# Patient Record
Sex: FEMALE | Race: WHITE | NOT HISPANIC OR LATINO | Employment: FULL TIME | URBAN - METROPOLITAN AREA
[De-identification: names, ages, dates, MRNs, and addresses within clinical notes are randomized per-mention and may not be internally consistent; named-entity substitution may affect disease eponyms.]

---

## 2023-05-10 ENCOUNTER — OFFICE VISIT (OUTPATIENT)
Dept: PRIMARY CARE | Facility: CLINIC | Age: 48
End: 2023-05-10
Payer: COMMERCIAL

## 2023-05-10 VITALS
DIASTOLIC BLOOD PRESSURE: 62 MMHG | TEMPERATURE: 97.7 F | HEART RATE: 62 BPM | WEIGHT: 142 LBS | SYSTOLIC BLOOD PRESSURE: 118 MMHG | BODY MASS INDEX: 26.13 KG/M2 | HEIGHT: 62 IN | RESPIRATION RATE: 16 BRPM

## 2023-05-10 DIAGNOSIS — R11.15 CYCLIC VOMITING SYNDROME: Primary | ICD-10-CM

## 2023-05-10 DIAGNOSIS — R11.0 NAUSEA: ICD-10-CM

## 2023-05-10 DIAGNOSIS — Z00.00 ANNUAL PHYSICAL EXAM: ICD-10-CM

## 2023-05-10 DIAGNOSIS — N95.9 MENOPAUSAL DISORDER: ICD-10-CM

## 2023-05-10 DIAGNOSIS — F41.9 ANXIETY: ICD-10-CM

## 2023-05-10 LAB
APPEARANCE UR: CLEAR
BILIRUB UR QL STRIP: NEGATIVE
COLOR UR: YELLOW
FOLLITROPIN (IU/L) IN SER/PLAS: 3.7 IU/L
GLUCOSE UR STRIP-MCNC: NEGATIVE MG/DL
HGB UR QL STRIP: NEGATIVE
KETONES UR STRIP-MCNC: NEGATIVE MG/DL
LEUKOCYTE ESTERASE UR QL STRIP: NEGATIVE
LUTEINIZING HORMONE (IU/ML) IN SER/PLAS: 0.5 IU/L
NITRITE UR QL STRIP: NEGATIVE
PH UR STRIP: 7 [PH]
PROT UR STRIP-MCNC: NEGATIVE MG/DL
SP GR UR STRIP.AUTO: 1.02
UROBILINOGEN UR STRIP-ACNC: 0.2 E.U./DL

## 2023-05-10 PROCEDURE — 99396 PREV VISIT EST AGE 40-64: CPT | Performed by: INTERNAL MEDICINE

## 2023-05-10 PROCEDURE — 80061 LIPID PANEL: CPT | Performed by: INTERNAL MEDICINE

## 2023-05-10 PROCEDURE — 93000 ELECTROCARDIOGRAM COMPLETE: CPT | Performed by: INTERNAL MEDICINE

## 2023-05-10 PROCEDURE — 80053 COMPREHEN METABOLIC PANEL: CPT | Performed by: INTERNAL MEDICINE

## 2023-05-10 PROCEDURE — 83001 ASSAY OF GONADOTROPIN (FSH): CPT

## 2023-05-10 PROCEDURE — 1036F TOBACCO NON-USER: CPT | Performed by: INTERNAL MEDICINE

## 2023-05-10 PROCEDURE — 83036 HEMOGLOBIN GLYCOSYLATED A1C: CPT | Performed by: INTERNAL MEDICINE

## 2023-05-10 PROCEDURE — 85025 COMPLETE CBC W/AUTO DIFF WBC: CPT | Performed by: INTERNAL MEDICINE

## 2023-05-10 PROCEDURE — 83002 ASSAY OF GONADOTROPIN (LH): CPT

## 2023-05-10 PROCEDURE — 84443 ASSAY THYROID STIM HORMONE: CPT | Performed by: INTERNAL MEDICINE

## 2023-05-10 RX ORDER — AMITRIPTYLINE HYDROCHLORIDE 25 MG/1
25 TABLET, FILM COATED ORAL NIGHTLY
Qty: 90 TABLET | Refills: 2 | Status: SHIPPED | OUTPATIENT
Start: 2023-05-10 | End: 2023-09-11 | Stop reason: SDUPTHER

## 2023-05-10 RX ORDER — ONDANSETRON 4 MG/1
4 TABLET, FILM COATED ORAL EVERY 8 HOURS PRN
Qty: 30 TABLET | Refills: 1 | Status: SHIPPED | OUTPATIENT
Start: 2023-05-10 | End: 2023-05-20

## 2023-05-10 RX ORDER — AMITRIPTYLINE HYDROCHLORIDE 10 MG/1
25 TABLET, FILM COATED ORAL NIGHTLY
Qty: 90 TABLET | Refills: 2 | Status: SHIPPED | OUTPATIENT
Start: 2023-05-10 | End: 2023-05-10 | Stop reason: SDUPTHER

## 2023-05-10 RX ORDER — AMITRIPTYLINE HYDROCHLORIDE 10 MG/1
10 TABLET, FILM COATED ORAL NIGHTLY
COMMUNITY
Start: 2022-08-24 | End: 2023-05-10 | Stop reason: SDUPTHER

## 2023-05-10 ASSESSMENT — ENCOUNTER SYMPTOMS
ABDOMINAL PAIN: 0
FATIGUE: 1
NAUSEA: 1
NERVOUS/ANXIOUS: 1
HEADACHES: 1
DIZZINESS: 1
ABDOMINAL DISTENTION: 0
SLEEP DISTURBANCE: 1
ACTIVITY CHANGE: 1
ARTHRALGIAS: 0
BACK PAIN: 0

## 2023-05-10 ASSESSMENT — PAIN SCALES - GENERAL: PAINLEVEL: 0-NO PAIN

## 2023-05-10 NOTE — PROGRESS NOTES
Subjective    Jessica Brown is a 47 y.o. female who presents for  Annual Physical Exam.  Has been having episodes of nausea, emesis episodes , worse in am.  Better, as a day progress.  Has episodes of anxiety.  Under a lot of stress lately.  Cannot concentrate.    HPI    This is a 47 years old [place]ym[place]-speaking lady with medical history of anxiety disorder, depression, s/p COVID -19 illness 12/2021.  Patient is  presented for Annual Physical exam.  Patient has episodes of nausea, emesis , worse in am.  Patient is weak, has anxiety in am.     Has burning sensation in her head.     Denies to have any fever, chills, no shortness of breath, no chest pain.  Has had COVID test done, was negative.        Review of Systems   Constitutional:  Positive for activity change and fatigue.   Gastrointestinal:  Positive for nausea. Negative for abdominal distention and abdominal pain.   Musculoskeletal:  Negative for arthralgias and back pain.   Neurological:  Positive for dizziness and headaches.   Psychiatric/Behavioral:  Positive for sleep disturbance. The patient is nervous/anxious.        Objective        Vitals:    05/10/23 0926   BP: 118/62   Pulse: 62   Resp: 16   Temp: 36.5 °C (97.7 °F)        Physical Exam  Constitutional:       Appearance: Normal appearance.   HENT:      Head: Normocephalic.      Nose: Nose normal.      Mouth/Throat:      Mouth: Mucous membranes are moist.   Cardiovascular:      Rate and Rhythm: Normal rate and regular rhythm.   Pulmonary:      Effort: Pulmonary effort is normal.      Breath sounds: Normal breath sounds.   Abdominal:      Palpations: Abdomen is soft.   Musculoskeletal:         General: Normal range of motion.      Cervical back: Normal range of motion.   Skin:     General: Skin is warm.   Neurological:      General: No focal deficit present.      Mental Status: She is alert and oriented to person, place, and time. Mental status is at baseline.       Diagnoses and all orders for this  visit:  Cyclic vomiting syndrome (Primary)  -     Discontinue: amitriptyline (Elavil) 10 mg tablet; Take 2.5 tablets (25 mg) by mouth once daily at bedtime.  -     amitriptyline (Elavil) 25 mg tablet; Take 1 tablet (25 mg) by mouth once daily at bedtime.  Annual physical exam  -     CBC  -     Comprehensive Metabolic Panel  -     ECG 12 lead  -     Hemoglobin A1C  -     Lipid Panel  -     Thyroid Stimulating Hormone  -     POCT UA (Automated) docked device  Menopausal disorder  -     FSH & LH  Anxiety  -     vortioxetine (Trintellix) 5 mg tablet tablet; Take 1 tablet (5 mg) by mouth once daily.  Nausea  -     ondansetron (Zofran) 4 mg tablet; Take 1 tablet (4 mg) by mouth every 8 hours if needed for vomiting for up to 10 days.  Other orders  -     POCT URINALYSIS AUTOMATED     -Depression, anxiety.  Take Trintellix as directed.    - Headaches,  Burning sensation in her head.  CT scan is negative.     - H of COVID -19 illness 12/2021.   Recovered.      -H of Candida laryngitis, esophagitis.  Resolved now.     -Mild vitamin D deficiency.  Take vitamin D daily.     -Elevated liver enzymes.  Liver ultrasound.     - Abnormal mammography August 2020.  S/p stereotactic core biopsy lower outer with Top-Hat clip placement 8/25/2020.  Stromal fibrosis and apocrine metaplastic cysts.  Microcalcifications identified.  Repeat mammography in 1 year, referral is placed.     -Health maintenance.  Complete  physical exam is today.  Last mammography August 2020, has had biopsy done, negative.  Vaccinations.     -Overweight with BMI 25.97  Diet, exercise.  Keep ideal BMI less than 25.     July Law MD

## 2023-05-11 NOTE — PROGRESS NOTES
Discussed with patient about blood work results.  Cholesterol is elevated to 14, LDL is 118.  Keep a strict diet, exercise, weight loss.

## 2023-06-21 ENCOUNTER — OFFICE VISIT (OUTPATIENT)
Dept: PRIMARY CARE | Facility: CLINIC | Age: 48
End: 2023-06-21
Payer: COMMERCIAL

## 2023-06-21 VITALS
HEIGHT: 62 IN | SYSTOLIC BLOOD PRESSURE: 118 MMHG | HEART RATE: 62 BPM | TEMPERATURE: 97.7 F | RESPIRATION RATE: 16 BRPM | BODY MASS INDEX: 26.13 KG/M2 | WEIGHT: 142 LBS | DIASTOLIC BLOOD PRESSURE: 78 MMHG

## 2023-06-21 DIAGNOSIS — M25.50 ARTHRALGIA, UNSPECIFIED JOINT: ICD-10-CM

## 2023-06-21 DIAGNOSIS — M79.10 MYALGIA: ICD-10-CM

## 2023-06-21 DIAGNOSIS — R10.9 ABDOMINAL PAIN, UNSPECIFIED ABDOMINAL LOCATION: Primary | ICD-10-CM

## 2023-06-21 LAB
CREATINE KINASE (U/L) IN SER/PLAS: 125 U/L (ref 0–215)
RHEUMATOID FACTOR (IU/ML) IN SERUM OR PLASMA: <10 IU/ML (ref 0–15)
SEDIMENTATION RATE, ERYTHROCYTE: 7 MM/H (ref 0–20)
URATE (MG/DL) IN SER/PLAS: 3.4 MG/DL (ref 2.3–6.7)

## 2023-06-21 PROCEDURE — 1036F TOBACCO NON-USER: CPT | Performed by: INTERNAL MEDICINE

## 2023-06-21 PROCEDURE — 86431 RHEUMATOID FACTOR QUANT: CPT

## 2023-06-21 PROCEDURE — 82306 VITAMIN D 25 HYDROXY: CPT | Performed by: INTERNAL MEDICINE

## 2023-06-21 PROCEDURE — 82607 VITAMIN B-12: CPT | Performed by: INTERNAL MEDICINE

## 2023-06-21 PROCEDURE — 99213 OFFICE O/P EST LOW 20 MIN: CPT | Performed by: INTERNAL MEDICINE

## 2023-06-21 PROCEDURE — 84550 ASSAY OF BLOOD/URIC ACID: CPT

## 2023-06-21 PROCEDURE — 86225 DNA ANTIBODY NATIVE: CPT

## 2023-06-21 PROCEDURE — 82550 ASSAY OF CK (CPK): CPT

## 2023-06-21 PROCEDURE — 86200 CCP ANTIBODY: CPT

## 2023-06-21 PROCEDURE — 86038 ANTINUCLEAR ANTIBODIES: CPT

## 2023-06-21 PROCEDURE — 85652 RBC SED RATE AUTOMATED: CPT

## 2023-06-21 PROCEDURE — 86235 NUCLEAR ANTIGEN ANTIBODY: CPT

## 2023-06-21 ASSESSMENT — ENCOUNTER SYMPTOMS
SHORTNESS OF BREATH: 0
MYALGIAS: 1
ACTIVITY CHANGE: 1
WEAKNESS: 1
UNEXPECTED WEIGHT CHANGE: 0
PALPITATIONS: 0
FATIGUE: 1
CHEST TIGHTNESS: 0
APPETITE CHANGE: 1

## 2023-06-21 ASSESSMENT — PAIN SCALES - GENERAL: PAINLEVEL: 0-NO PAIN

## 2023-06-21 NOTE — PROGRESS NOTES
Subjective    Jessica Brown is a 47 y.o. female who presents for follow up.  Complaining of myalgias.  Has been weak, has low energy.    HPI    This is a 47 years old Romanian-speaking lady with medical history of anxiety disorder, depression, s/p COVID -19 illness 12/2021.  Patient is  presented for  follow up.  Weak.  Taking a small dose of trintellix.  No emesis, nausea episodes.  Interested to see GI.  Has abdominal discomfort.  Seen by [facility], had offered many supplements to take.  Was advised, that has hernia and Thyroid problems.     Review of Systems   Constitutional:  Positive for activity change, appetite change and fatigue. Negative for unexpected weight change.   Respiratory:  Negative for chest tightness and shortness of breath.    Cardiovascular:  Negative for chest pain, palpitations and leg swelling.   Musculoskeletal:  Positive for myalgias.   Neurological:  Positive for weakness.       Objective        Vitals:    06/21/23 1649   BP: 118/78   Pulse: 62   Resp: 16   Temp: 36.5 °C (97.7 °F)        Physical Exam  Constitutional:       Appearance: Normal appearance.   HENT:      Head: Normocephalic.      Mouth/Throat:      Mouth: Mucous membranes are moist.   Eyes:      Extraocular Movements: Extraocular movements intact.   Cardiovascular:      Rate and Rhythm: Normal rate.   Pulmonary:      Effort: Pulmonary effort is normal.   Abdominal:      Palpations: Abdomen is soft.   Musculoskeletal:         General: Normal range of motion.      Cervical back: Normal range of motion.   Skin:     General: Skin is warm.   Neurological:      General: No focal deficit present.      Mental Status: She is alert and oriented to person, place, and time.   Psychiatric:         Mood and Affect: Mood normal.       Diagnoses and all orders for this visit:  Abdominal pain, unspecified abdominal location (Primary)  -     Referral to Gastroenterology; Future  Myalgia  -     Vitamin B12  -     Vitamin D, Total  -      ZAYDA + RYLAN Panel  -     Cancel: Anti-DNA Antibody, Double-Stranded  -     Arthritis Panel (CMS)  -     Citrulline Antibody, IgG  -     Creatine Kinase  Arthralgia, unspecified joint  -     Vitamin B12  -     Vitamin D, Total  -     ZAYDA + RYLAN Panel  -     Cancel: Anti-DNA Antibody, Double-Stranded  -     Arthritis Panel (CMS)  -     Citrulline Antibody, IgG  -     Creatine Kinase         Cyclic vomiting syndrome (Primary)   Much improved, self stopped Amitriptyline.    Anxiety, depression.  -  Continue to take  vortioxetine (Trintellix) 5 mg tablet tablet; Take 1 tablet (5 mg) by mouth once daily.    - Headaches,   Improved, CT scan was negative.     - H of COVID -19 illness 12/2021.   Recovered.      -H of Candida laryngitis, esophagitis.  Resolved now.     -Mild vitamin D deficiency.  Take vitamin D daily.     -Elevated liver enzymes.  Liver ultrasound.     - Abnormal mammography August 2020.  S/p stereotactic core biopsy lower outer with Top-Hat clip placement 8/25/2020.  Stromal fibrosis and apocrine metaplastic cysts.  Microcalcifications identified.  Repeat mammography in 1 year, referral is placed.     -Health maintenance.  Complete  physical exam is  up to date.  Last mammography August 2020, has had biopsy done, negative.  Vaccinations.     -Overweight with BMI 25.97  Diet, exercise.  Keep ideal BMI less than 25.

## 2023-06-22 LAB
ANTI-CENTROMERE: <0.2 AI
ANTI-CHROMATIN: <0.2 AI
ANTI-DNA (DS): 2 IU/ML
ANTI-JO-1 IGG: <0.2 AI
ANTI-NUCLEAR ANTIBODY (ANA): NEGATIVE
ANTI-NUCLEAR ANTIBODY (ANA): NEGATIVE
ANTI-RIBOSOMAL P: <0.2 AI
ANTI-RNP: <0.2 AI
ANTI-SCL-70: <0.2 AI
ANTI-SM/RNP: <0.2 AI
ANTI-SM: <0.2 AI
ANTI-SSA: <0.2 AI
ANTI-SSB: <0.2 AI
CITRULLINE ANTIBODY, IGG: <1 U/ML

## 2023-07-25 ENCOUNTER — TELEPHONE (OUTPATIENT)
Dept: PRIMARY CARE | Facility: CLINIC | Age: 48
End: 2023-07-25

## 2023-07-25 ENCOUNTER — TELEMEDICINE (OUTPATIENT)
Dept: PRIMARY CARE | Facility: CLINIC | Age: 48
End: 2023-07-25
Payer: COMMERCIAL

## 2023-07-25 DIAGNOSIS — M54.42 LEFT-SIDED LOW BACK PAIN WITH LEFT-SIDED SCIATICA, UNSPECIFIED CHRONICITY: Primary | ICD-10-CM

## 2023-07-25 PROCEDURE — 99212 OFFICE O/P EST SF 10 MIN: CPT | Performed by: INTERNAL MEDICINE

## 2023-07-25 RX ORDER — PREDNISONE 20 MG/1
TABLET ORAL
Qty: 30 TABLET | Refills: 0 | Status: SHIPPED | OUTPATIENT
Start: 2023-07-25 | End: 2024-01-16 | Stop reason: WASHOUT

## 2023-07-25 ASSESSMENT — ENCOUNTER SYMPTOMS
ACTIVITY CHANGE: 1
ARTHRALGIAS: 1
BACK PAIN: 1

## 2023-07-25 NOTE — PROGRESS NOTES
Subjective    Jessica Brown is a 47 y.o. female who is evaluated Via telemedicine for Low back pain.  Has low back pain  with sciatica.    HPI    This is a 47 years old [place]-speaking lady with medical history of anxiety disorder, depression, s/p COVID -19 illness 12/2021.  Patient is dated via telemedicine.  Stated, that developed low back pain 1 week ago, with radiation to her left leg.  She  is having difficulty to ambulate, difficulty to change of position.  Has been treated by chiropractor, pain is getting worse.          Review of Systems   Constitutional:  Positive for activity change.   Musculoskeletal:  Positive for arthralgias and back pain.       Objective      There were no vitals filed for this visit.     Physical Exam  Diagnoses and all orders for this visit:  Left-sided low back pain with left-sided sciatica, unspecified chronicity (Primary)  -     predniSONE (Deltasone) 20 mg tablet; Take 3 tab 60 mg daily  with food for 3 days, then 2 tab daily for 3 days, then 1 tab daily for 3 days , then 1/2 tab daily for 3 days, then stop.     Cyclic vomiting syndrome (Primary)   Much improved, self stopped Amitriptyline.     Anxiety, depression.  -  Continue to take  vortioxetine (Trintellix) 5 mg tablet tablet; Take 1 tablet (5 mg) by mouth once daily.     - Headaches,   Improved, CT scan was negative.     - H of COVID -19 illness 12/2021.   Recovered.      -H of Candida laryngitis, esophagitis.  Resolved now.     -Mild vitamin D deficiency.  Take vitamin D daily.     -Elevated liver enzymes.  Liver ultrasound.     - Abnormal mammography August 2020.  S/p stereotactic core biopsy lower outer with Top-Hat clip placement 8/25/2020.  Stromal fibrosis and apocrine metaplastic cysts.  Microcalcifications identified.  Repeat mammography in 1 year, referral is placed.     -Health maintenance.  Complete  physical exam is  up to date.  Last mammography August 2020, has had biopsy done, negative.  Vaccinations.      -Overweight with BMI 25.97  Diet, exercise.  Keep ideal BMI less than 25.    This visit was completed via telephone due to the restrictions of the COVID -19 pandemic.  All issues as above were discussed and addressed, but no physical exam was performed.  If it was felt, that the patient should be evaluated in clinic, then they were directed there.  The patient verbally consented to visit.    Total time spent with patient is greater than  12 minutes, more than 50% of the time is spent in direct patient telemedicine, patient consultation and coordination of care.  Patient voiced a full understanding of all treatment plans.  All patient questions has been answered to patient's satisfaction.   July Law MD

## 2023-07-26 DIAGNOSIS — M54.42 LEFT-SIDED LOW BACK PAIN WITH LEFT-SIDED SCIATICA, UNSPECIFIED CHRONICITY: Primary | ICD-10-CM

## 2023-07-26 RX ORDER — CYCLOBENZAPRINE HCL 10 MG
10 TABLET ORAL NIGHTLY PRN
Qty: 30 TABLET | Refills: 0 | Status: SHIPPED | OUTPATIENT
Start: 2023-07-26 | End: 2024-01-16

## 2023-07-26 NOTE — TELEPHONE ENCOUNTER
Pt got steroid yesterday and taking it, but would like to have muscle relexor for back pain, has to work no vacation time available, please send to [facility]

## 2023-08-11 DIAGNOSIS — M54.42 LEFT-SIDED LOW BACK PAIN WITH LEFT-SIDED SCIATICA, UNSPECIFIED CHRONICITY: Primary | ICD-10-CM

## 2023-09-11 ENCOUNTER — TELEPHONE (OUTPATIENT)
Dept: PRIMARY CARE | Facility: CLINIC | Age: 48
End: 2023-09-11
Payer: COMMERCIAL

## 2023-09-11 DIAGNOSIS — R11.15 CYCLIC VOMITING SYNDROME: ICD-10-CM

## 2023-09-11 RX ORDER — AMITRIPTYLINE HYDROCHLORIDE 25 MG/1
25 TABLET, FILM COATED ORAL NIGHTLY
Qty: 90 TABLET | Refills: 2 | Status: SHIPPED | OUTPATIENT
Start: 2023-09-11 | End: 2024-01-16 | Stop reason: SDUPTHER

## 2023-09-27 ENCOUNTER — APPOINTMENT (OUTPATIENT)
Dept: PRIMARY CARE | Facility: CLINIC | Age: 48
End: 2023-09-27
Payer: COMMERCIAL

## 2023-10-16 ENCOUNTER — APPOINTMENT (OUTPATIENT)
Dept: PRIMARY CARE | Facility: CLINIC | Age: 48
End: 2023-10-16
Payer: COMMERCIAL

## 2024-01-16 ENCOUNTER — OFFICE VISIT (OUTPATIENT)
Dept: PRIMARY CARE | Facility: CLINIC | Age: 49
End: 2024-01-16
Payer: COMMERCIAL

## 2024-01-16 VITALS
DIASTOLIC BLOOD PRESSURE: 68 MMHG | TEMPERATURE: 97.3 F | WEIGHT: 143 LBS | SYSTOLIC BLOOD PRESSURE: 118 MMHG | BODY MASS INDEX: 27.93 KG/M2 | HEART RATE: 64 BPM | RESPIRATION RATE: 16 BRPM

## 2024-01-16 DIAGNOSIS — E53.8 VITAMIN B12 DEFICIENCY: ICD-10-CM

## 2024-01-16 DIAGNOSIS — E54 VITAMIN C DEFICIENCY: ICD-10-CM

## 2024-01-16 DIAGNOSIS — R11.15 CYCLIC VOMITING SYNDROME: ICD-10-CM

## 2024-01-16 DIAGNOSIS — E78.5 HYPERLIPIDEMIA, UNSPECIFIED HYPERLIPIDEMIA TYPE: ICD-10-CM
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
## 2024-01-16 DIAGNOSIS — R10.9 ABDOMINAL PAIN, UNSPECIFIED ABDOMINAL LOCATION: ICD-10-CM

## 2024-01-16 DIAGNOSIS — Z12.31 ENCOUNTER FOR SCREENING MAMMOGRAM FOR MALIGNANT NEOPLASM OF BREAST: Primary | ICD-10-CM

## 2024-01-16 DIAGNOSIS — E53.8 FOLATE DEFICIENCY: ICD-10-CM

## 2024-01-16 DIAGNOSIS — N95.9 MENOPAUSAL DISORDER: ICD-10-CM

## 2024-01-16 DIAGNOSIS — R53.81 MALAISE AND FATIGUE: ICD-10-CM

## 2024-01-16 DIAGNOSIS — M25.50 ARTHRALGIA, UNSPECIFIED JOINT: ICD-10-CM

## 2024-01-16 DIAGNOSIS — R53.83 MALAISE AND FATIGUE: ICD-10-CM

## 2024-01-16 DIAGNOSIS — R73.9 HYPERGLYCEMIA, UNSPECIFIED: ICD-10-CM

## 2024-01-16 DIAGNOSIS — E55.9 VITAMIN D DEFICIENCY: ICD-10-CM

## 2024-01-16 PROCEDURE — 82306 VITAMIN D 25 HYDROXY: CPT | Performed by: INTERNAL MEDICINE

## 2024-01-16 PROCEDURE — 83001 ASSAY OF GONADOTROPIN (FSH): CPT

## 2024-01-16 PROCEDURE — 82746 ASSAY OF FOLIC ACID SERUM: CPT | Performed by: INTERNAL MEDICINE

## 2024-01-16 PROCEDURE — 85025 COMPLETE CBC W/AUTO DIFF WBC: CPT | Performed by: INTERNAL MEDICINE

## 2024-01-16 PROCEDURE — 86140 C-REACTIVE PROTEIN: CPT

## 2024-01-16 PROCEDURE — 36415 COLL VENOUS BLD VENIPUNCTURE: CPT

## 2024-01-16 PROCEDURE — 80053 COMPREHEN METABOLIC PANEL: CPT | Performed by: INTERNAL MEDICINE

## 2024-01-16 PROCEDURE — 82607 VITAMIN B-12: CPT | Performed by: INTERNAL MEDICINE

## 2024-01-16 PROCEDURE — 83002 ASSAY OF GONADOTROPIN (LH): CPT

## 2024-01-16 PROCEDURE — 1036F TOBACCO NON-USER: CPT | Performed by: INTERNAL MEDICINE

## 2024-01-16 PROCEDURE — 84146 ASSAY OF PROLACTIN: CPT

## 2024-01-16 PROCEDURE — 84443 ASSAY THYROID STIM HORMONE: CPT | Performed by: INTERNAL MEDICINE

## 2024-01-16 PROCEDURE — 83036 HEMOGLOBIN GLYCOSYLATED A1C: CPT | Performed by: INTERNAL MEDICINE

## 2024-01-16 PROCEDURE — 99215 OFFICE O/P EST HI 40 MIN: CPT | Performed by: INTERNAL MEDICINE

## 2024-01-16 RX ORDER — AMOXICILLIN AND CLAVULANATE POTASSIUM 875; 125 MG/1; MG/1
875 TABLET, FILM COATED ORAL 2 TIMES DAILY
Qty: 20 TABLET | Refills: 0 | Status: SHIPPED | OUTPATIENT
Start: 2024-01-16 | End: 2024-01-26

## 2024-01-16 RX ORDER — AMITRIPTYLINE HYDROCHLORIDE 25 MG/1
25 TABLET, FILM COATED ORAL NIGHTLY
Qty: 90 TABLET | Refills: 2 | Status: SHIPPED | OUTPATIENT
Start: 2024-01-16 | End: 2024-04-15

## 2024-01-16 ASSESSMENT — ENCOUNTER SYMPTOMS
UNEXPECTED WEIGHT CHANGE: 0
BACK PAIN: 1
FREQUENCY: 0
HEADACHES: 0
DIZZINESS: 1
FATIGUE: 1
ARTHRALGIAS: 1
PALPITATIONS: 1

## 2024-01-16 ASSESSMENT — PAIN SCALES - GENERAL: PAINLEVEL: 0-NO PAIN

## 2024-01-16 NOTE — PROGRESS NOTES
Subjective    Jessica Brown is a 48 y.o. female who presents for  follow up.  Patient has nausea, episodes.  Has weakness in am.  Has been having low energy.  Depressed.  Started to have Hot Flushes, having diaphoresis.  Has L thigh pain, induration, due to dog bite 1 week ago.    HPI    This is a 48 years old Nigerian-speaking lady with medical history of anxiety disorder, depression, s/p COVID -19 illness 12/2021.  Patient is  presented for evaluation and treatment of the above complaints.  Has low energy, weak, worse in am.  Patient has ongoing issues with nausea, episodes.   Has L thigh pain, induration, tenderness, due to dog bite.     Review of Systems   Constitutional:  Positive for fatigue. Negative for unexpected weight change.   Cardiovascular:  Positive for palpitations. Negative for chest pain and leg swelling.   Genitourinary:  Negative for frequency and menstrual problem.   Musculoskeletal:  Positive for arthralgias, back pain and gait problem.   Neurological:  Positive for dizziness. Negative for headaches.       Objective        Vitals:    01/16/24 1443   BP: 118/68   Pulse: 64   Resp: 16   Temp: 36.3 °C (97.3 °F)        Physical Exam  HENT:      Head: Normocephalic.      Nose: Nose normal.   Eyes:      Pupils: Pupils are equal, round, and reactive to light.   Cardiovascular:      Rate and Rhythm: Normal rate and regular rhythm.   Pulmonary:      Effort: Pulmonary effort is normal.   Abdominal:      Palpations: Abdomen is soft.   Musculoskeletal:         General: Tenderness present.   Skin:     Findings: Lesion present.   Neurological:      General: No focal deficit present.      Mental Status: She is alert. Mental status is at baseline.       Diagnoses and all orders for this visit:  Encounter for screening mammogram for malignant neoplasm of breast (Primary)  -     BI mammo bilateral screening tomosynthesis; Future  Abdominal pain, unspecified abdominal location  -     CT abdomen pelvis w IV  contrast; Future  Dog bite, sequela  -     amoxicillin-pot clavulanate (Augmentin) 875-125 mg tablet; Take 1 tablet (875 mg) by mouth 2 times a day for 10 days.  Menopausal disorder  -     FSH & LH  -     Prolactin  Malaise and fatigue  -     CBC  -     Thyroid Stimulating Hormone  Vitamin B12 deficiency  -     Vitamin B12  Vitamin C deficiency  Folate deficiency  -     Folate  Hyperlipidemia, unspecified hyperlipidemia type  -     Comprehensive Metabolic Panel  Vitamin D deficiency  -     Vitamin D 25-Hydroxy,Total (for eval of Vitamin D levels)  Hyperglycemia, unspecified  -     Hemoglobin A1C  Arthralgia, unspecified joint  -     C-Reactive Protein  Cyclic vomiting syndrome  -     amitriptyline (Elavil) 25 mg tablet; Take 1 tablet (25 mg) by mouth once daily at bedtime.     L thigh pain.  Dog bite 2 weeks ago.  Has induration, tenderness.   Take Augmentin as directed.    Cyclic vomiting syndrome (Primary)   Take  Amitriptyline, you may increase the dose to 50 mg at night time.   Had EGD evaluation.   s/pEGD gastritis 9/25/2023.  H pylori was not identified.    Anxiety, depression.  -  Continue to take vortioxetine (Trintellix) ,  Increase to 10 mg tablet tablet.    - Headaches,   Improved, CT scan was negative.     - H of COVID -19 illness 12/2021.   Recovered.      -H of Candida laryngitis, esophagitis.  Resolved now.     -Mild vitamin D deficiency.  Take vitamin D daily.     -H of Elevated liver enzymes.  Liver ultrasound.  Repeat Blood work.     - Abnormal mammography August 2020.  S/p stereotactic core biopsy lower outer with Top-Hat clip placement 8/25/2020.  Stromal fibrosis and apocrine metaplastic cysts.  Microcalcifications identified.  Repeat mammography in 1 year, referral is placed.     -Health maintenance.  Complete  physical exam is  up to date.  Last mammography August 2020, has had biopsy done, negative.  Vaccinations.     -Overweight with BMI 27.93.  Diet, exercise.  Keep ideal BMI less than  25.  Return in 2 weeks.     July Law MD

## 2024-01-17 ENCOUNTER — TELEPHONE (OUTPATIENT)
Dept: PRIMARY CARE | Facility: CLINIC | Age: 49
End: 2024-01-17
Payer: COMMERCIAL

## 2024-01-17 LAB
CRP SERPL-MCNC: 0.13 MG/DL
FSH SERPL-ACNC: 50 IU/L
LH SERPL-ACNC: 15 IU/L
PROLACTIN SERPL-MCNC: 7.5 UG/L (ref 3–20)

## 2024-02-05 ENCOUNTER — HOSPITAL ENCOUNTER (OUTPATIENT)
Dept: RADIOLOGY | Facility: CLINIC | Age: 49
Discharge: HOME | End: 2024-02-05
Payer: COMMERCIAL

## 2024-02-05 VITALS — HEIGHT: 60 IN | WEIGHT: 143.08 LBS | BODY MASS INDEX: 28.09 KG/M2

## 2024-02-05 DIAGNOSIS — Z12.31 ENCOUNTER FOR SCREENING MAMMOGRAM FOR MALIGNANT NEOPLASM OF BREAST: ICD-10-CM

## 2024-02-05 PROCEDURE — 77067 SCR MAMMO BI INCL CAD: CPT

## 2024-02-05 PROCEDURE — 77067 SCR MAMMO BI INCL CAD: CPT | Performed by: RADIOLOGY

## 2024-02-05 PROCEDURE — 77063 BREAST TOMOSYNTHESIS BI: CPT | Performed by: RADIOLOGY

## 2024-02-07 ENCOUNTER — HOSPITAL ENCOUNTER (OUTPATIENT)
Dept: RADIOLOGY | Facility: EXTERNAL LOCATION | Age: 49
Discharge: HOME | End: 2024-02-07

## 2024-03-11 DIAGNOSIS — M79.642 LEFT HAND PAIN: ICD-10-CM

## 2024-03-13 ENCOUNTER — OFFICE VISIT (OUTPATIENT)
Dept: ORTHOPEDIC SURGERY | Facility: CLINIC | Age: 49
End: 2024-03-13
Payer: COMMERCIAL

## 2024-03-13 ENCOUNTER — HOSPITAL ENCOUNTER (OUTPATIENT)
Dept: RADIOLOGY | Facility: CLINIC | Age: 49
Discharge: HOME | End: 2024-03-13
Payer: COMMERCIAL

## 2024-03-13 VITALS — HEIGHT: 60 IN | WEIGHT: 136 LBS | BODY MASS INDEX: 26.7 KG/M2

## 2024-03-13 DIAGNOSIS — M79.642 LEFT HAND PAIN: ICD-10-CM

## 2024-03-13 DIAGNOSIS — M65.4 DE QUERVAIN'S TENOSYNOVITIS, LEFT: Primary | ICD-10-CM

## 2024-03-13 PROCEDURE — 99204 OFFICE O/P NEW MOD 45 MIN: CPT | Performed by: ORTHOPAEDIC SURGERY

## 2024-03-13 PROCEDURE — 73130 X-RAY EXAM OF HAND: CPT | Mod: LT

## 2024-03-13 PROCEDURE — 73130 X-RAY EXAM OF HAND: CPT | Mod: LEFT SIDE | Performed by: RADIOLOGY

## 2024-03-13 PROCEDURE — L3809 WHFO W/O JOINTS PRE OTS: HCPCS | Performed by: ORTHOPAEDIC SURGERY

## 2024-03-13 PROCEDURE — 20550 NJX 1 TENDON SHEATH/LIGAMENT: CPT | Performed by: ORTHOPAEDIC SURGERY

## 2024-03-13 PROCEDURE — 1036F TOBACCO NON-USER: CPT | Performed by: ORTHOPAEDIC SURGERY

## 2024-03-13 RX ORDER — LIDOCAINE HYDROCHLORIDE 10 MG/ML
1 INJECTION INFILTRATION; PERINEURAL
Status: COMPLETED | OUTPATIENT
Start: 2024-03-13 | End: 2024-03-13

## 2024-03-13 RX ADMIN — LIDOCAINE HYDROCHLORIDE 1 ML: 10 INJECTION INFILTRATION; PERINEURAL at 10:47

## 2024-03-13 ASSESSMENT — PAIN - FUNCTIONAL ASSESSMENT: PAIN_FUNCTIONAL_ASSESSMENT: 0-10

## 2024-03-13 ASSESSMENT — PAIN SCALES - GENERAL: PAINLEVEL_OUTOF10: 10 - WORST POSSIBLE PAIN

## 2024-03-13 NOTE — PROGRESS NOTES
Jessica is a 48 y.o. female referred by her mother for left hand pain that starts from her thumb and wrist and goes to her elbow.  She denies numbness and tingling.  She does have burning pain that has been unbearable lately.  No prior surgery or injury to that hand.

## 2024-03-13 NOTE — PROGRESS NOTES
48 y.o. female presents today for evaluation of  left radial sided wrist pain. The patient reports  symptoms for several weeks getting worse. Pain is controlled. Patient reports no numbness and tingling.  Reports no previous surgeries, injections, or trauma to the area.  Reports pain worse with use, better at rest.   Pain dull ache, sharp at times.    worse picking things up and lifting things.  She states she had a similar issue on her right side that underwent shots and eventual surgery for continued pain.    Review of Systems    Constitutional: see HPI, no fever, no chills, not feeling tired, no significant weight gain or weight loss.   Eyes: No vision changes  ENT: no nosebleeds.   Cardiovascular: no chest pain.   Respiratory: no shortness of breath and no cough.   Gastrointestinal: no abdominal pain, no nausea, no vomiting and no diarrhea.   Musculoskeletal: per HPI  Neurological: no headache, no gait disturbances  Psychiatric: no depression and no sleep disturbances.   Endocrine: no muscle weakness and no muscle cramps.   Hematologic/Lymphatic: no swollen glands and no tendency for easy bruising or excessive swelling.     Patient's past medical history, past surgical history, allergies, and medications have been reviewed unless otherwise noted in the chart.     DeQuervain's Exam:  Inspection:  no evidence of infection, no edema, no erythema, no ecchymosis, Palpation:  compartments are soft, pain with palpation over the radial wrist, Range of Motion:  full wrist and finger range of motion, Stability:  no wrist or finger instability detected, Strength:  5/5  and strength, Skin:  intact, Vascular:  capillary refill <2 seconds distally, Sensation:  intact to light touch distally, Test:  positive Finkelstein's, negative Thumb CMC Grind.      Constitutional   General appearance: Alert and in no acute distress. Well developed, well nourished.    Eyes   External Eye, Conjunctiva and lids: Normal external exam -  pupils were equal in size, round, reactive to light (PERRL) with normal accommodation and extraocular movements intact (EOMI).   Ears, Nose, Mouth, and Throat   Hearing: Normal.   Neck   Neck: No neck mass was observed. Supple.   Pulmonary   Respiratory effort: No respiratory distress.   Cardiovascular   Examination of extremities: No peripheral edema.   Psychiatric   Judgment and insight: Intact.   Orientation to person, place, and time: Alert and oriented x 3.       Mood and affect: Normal.      XR - no fractures, no dislocations, or no osseous abnormalities      left de Quervain's tenosynovitis  Based on the history, physical exam and imaging studies above, the patient's presentation is consistent with the above diagnosis.  I had a long discussion with the patient regarding their presentation and the treatment options.  We discussed initial nonoperative versus operative management options as well as potential further diagnostic imaging.  We again discussed her treatment options going forward along with their associated risks and benefits. After thorough discussion, the patient has elected to proceed with conservative management. All questions were answered to the patients satisfaction who seems satisfied with the plan.  They will call the office with any questions/concerns.    Discussion I discussed the diagnosis and treatment options with the patient today along with their associated risks and benefits. After thorough discussion, the patient has elected to proceed with a corticosteroid injection with Kenalog and Xylocaine, which was performed in the office today under aseptic technique and the patient tolerated the procedure well.          Ortho Injections:           Injection site  left first dorsal compartment. Medication 1 cc 1% Xylocaine, 1 cc 10 mg Kenalog, Injection given under sterile conditions. No immediate complications noted. Post injection instructions given.   thumb spica   Voltaren gel   follow-up  8 weeks as needed no x-ray    Patient ID: Jessica Brown is a 48 y.o. female.    Hand / UE Inj/Asp: L extensor compartment 1 for de Quervain's tenosynovitis on 3/13/2024 10:47 AM  Indications: pain  Details: 25 G needle, radial approach  Medications: 1 mL lidocaine 10 mg/mL (1 %); 10 mg triamcinolone acetonide 10 mg/mL  Outcome: tolerated well, no immediate complications    Risks including, but not limited to, potential for skin discoloration, incomplete relief of symptoms, infection and recurrence of symptoms were reviewed.  Procedure, treatment alternatives, risks and benefits explained, specific risks discussed. Consent was given by the patient. Immediately prior to procedure a time out was called to verify the correct patient, procedure, equipment, support staff and site/side marked as required. Patient was prepped and draped in the usual sterile fashion.

## 2024-06-13 ENCOUNTER — APPOINTMENT (OUTPATIENT)
Dept: ORTHOPEDIC SURGERY | Facility: CLINIC | Age: 49
End: 2024-06-13
Payer: COMMERCIAL

## 2024-06-13 DIAGNOSIS — M65.4 DE QUERVAIN'S TENOSYNOVITIS, LEFT: Primary | ICD-10-CM

## 2024-06-13 RX ORDER — MELOXICAM 15 MG/1
15 TABLET ORAL DAILY
Qty: 30 TABLET | Refills: 2 | Status: SHIPPED | OUTPATIENT
Start: 2024-06-13 | End: 2025-06-13

## 2024-06-13 RX ORDER — LIDOCAINE HYDROCHLORIDE 10 MG/ML
1 INJECTION INFILTRATION; PERINEURAL
Status: COMPLETED | OUTPATIENT
Start: 2024-06-13 | End: 2024-06-13

## 2024-06-13 NOTE — PROGRESS NOTES
follow up 3/13/2024 De Quervain's tenosynovitis, left injection. Patient would like another injection

## 2024-06-13 NOTE — PROGRESS NOTES
48 y.o. female presents today for follow-up of  left radial sided wrist pain. The patient reports  symptoms for several months getting worse. Pain is controlled. Patient reports no numbness and tingling.  Reports no previous surgeries or trauma to the area.  Reports pain worse with use, better at rest.   Pain dull ache, sharp at times.    worse picking things up and lifting things.  She states she had a similar issue on her right side that underwent shots and eventual surgery for continued pain.  We did a shot that helped for about a month or so and then symptoms returned.  She would like to try another.    Review of Systems    Constitutional: see HPI, no fever, no chills, not feeling tired, no significant weight gain or weight loss.   Eyes: No vision changes  ENT: no nosebleeds.   Cardiovascular: no chest pain.   Respiratory: no shortness of breath and no cough.   Gastrointestinal: no abdominal pain, no nausea, no vomiting and no diarrhea.   Musculoskeletal: per HPI  Neurological: no headache, no gait disturbances  Psychiatric: no depression and no sleep disturbances.   Endocrine: no muscle weakness and no muscle cramps.   Hematologic/Lymphatic: no swollen glands and no tendency for easy bruising or excessive swelling.     Patient's past medical history, past surgical history, allergies, and medications have been reviewed unless otherwise noted in the chart.     DeQuervain's Exam:  Inspection:  no evidence of infection, no edema, no erythema, no ecchymosis, Palpation:  compartments are soft, pain with palpation over the radial wrist, Range of Motion:  full wrist and finger range of motion, Stability:  no wrist or finger instability detected, Strength:  5/5  and strength, Skin:  intact, Vascular:  capillary refill <2 seconds distally, Sensation:  intact to light touch distally, Test:  positive Finkelstein's, negative Thumb CMC Grind.      Constitutional   General appearance: Alert and in no acute distress. Well  developed, well nourished.    Eyes   External Eye, Conjunctiva and lids: Normal external exam - pupils were equal in size, round, reactive to light (PERRL) with normal accommodation and extraocular movements intact (EOMI).   Ears, Nose, Mouth, and Throat   Hearing: Normal.   Neck   Neck: No neck mass was observed. Supple.   Pulmonary   Respiratory effort: No respiratory distress.   Cardiovascular   Examination of extremities: No peripheral edema.   Psychiatric   Judgment and insight: Intact.   Orientation to person, place, and time: Alert and oriented x 3.       Mood and affect: Normal.       left de Quervain's tenosynovitis  Based on the history, physical exam and imaging studies above, the patient's presentation is consistent with the above diagnosis.  I had a long discussion with the patient regarding their presentation and the treatment options.  We discussed initial nonoperative versus operative management options as well as potential further diagnostic imaging.  We again discussed her treatment options going forward along with their associated risks and benefits. After thorough discussion, the patient has elected to proceed with conservative management. All questions were answered to the patients satisfaction who seems satisfied with the plan.  They will call the office with any questions/concerns.    Discussion I discussed the diagnosis and treatment options with the patient today along with their associated risks and benefits. After thorough discussion, the patient has elected to proceed with a corticosteroid injection with Kenalog and Xylocaine, which was performed in the office today under aseptic technique and the patient tolerated the procedure well.          Ortho Injections:           Injection site  left first dorsal compartment. Medication 1 cc 1% Xylocaine, 1 cc 10 mg Kenalog, Injection given under sterile conditions. No immediate complications noted. Post injection instructions given.   thumb  spica   Voltaren gel   follow-up 8 weeks as needed no x-ray    Patient ID: Jessica Brown is a 48 y.o. female.    Hand / UE Inj/Asp: L extensor compartment 1 for de Quervain's tenosynovitis on 6/13/2024 9:11 AM  Indications: pain  Details: 25 G needle, radial approach  Medications: 1 mL lidocaine 10 mg/mL (1 %); 10 mg triamcinolone acetonide 10 mg/mL  Outcome: tolerated well, no immediate complications    Risks including, but not limited to, potential for skin discoloration, incomplete relief of symptoms, infection and recurrence of symptoms were reviewed.  Procedure, treatment alternatives, risks and benefits explained, specific risks discussed. Consent was given by the patient. Immediately prior to procedure a time out was called to verify the correct patient, procedure, equipment, support staff and site/side marked as required. Patient was prepped and draped in the usual sterile fashion.

## 2024-09-06 LAB
APPEARANCE UR: CLEAR
BILIRUB UR QL STRIP: NEGATIVE
COLOR UR: YELLOW
GLUCOSE UR STRIP-MCNC: NEGATIVE MG/DL
HGB UR QL STRIP: NEGATIVE
KETONES UR STRIP-MCNC: NEGATIVE MG/DL
LEUKOCYTE ESTERASE UR QL STRIP: NEGATIVE
NITRITE UR QL STRIP: NEGATIVE
PH UR STRIP: 7 [PH]
PROT UR STRIP-MCNC: NEGATIVE MG/DL
SP GR UR STRIP.AUTO: 1.02
UROBILINOGEN UR STRIP-ACNC: 0.2 E.U./DL

## 2024-09-09 NOTE — PROGRESS NOTES
Patient had stress at work, has been having nausea, depressed.  Resume taking Trintellix 1050 mg once a day.  Resume taking amitriptyline 25 mg at nighttime.

## 2024-09-16 ENCOUNTER — OFFICE VISIT (OUTPATIENT)
Dept: PRIMARY CARE | Facility: CLINIC | Age: 49
End: 2024-09-16
Payer: COMMERCIAL

## 2024-09-16 DIAGNOSIS — E53.8 VITAMIN B12 DEFICIENCY: Primary | ICD-10-CM

## 2024-09-16 DIAGNOSIS — R11.0 NAUSEA: ICD-10-CM

## 2024-09-16 DIAGNOSIS — F41.9 ANXIETY: ICD-10-CM

## 2024-09-16 DIAGNOSIS — Z00.00 ANNUAL PHYSICAL EXAM: ICD-10-CM

## 2024-09-16 DIAGNOSIS — E55.9 VITAMIN D DEFICIENCY: ICD-10-CM

## 2024-09-16 DIAGNOSIS — R10.9 ABDOMINAL PAIN, UNSPECIFIED ABDOMINAL LOCATION: ICD-10-CM

## 2024-09-16 LAB
BASOPHILS # BLD AUTO: 0.02 X10*3/UL (ref 0.1–1.6)
BASOPHILS NFR BLD AUTO: 0.36 % (ref 0–0.3)
EOSINOPHIL # BLD AUTO: 0.05 X10*3/UL (ref 0.04–0.5)
EOSINOPHIL NFR BLD AUTO: 0.76 % (ref 0.7–7)
ERYTHROCYTE [DISTWIDTH] IN BLOOD BY AUTOMATED COUNT: 13.9 % (ref 11.5–14.5)
HBA1C MFR BLD: 5.3 %
HCT VFR BLD AUTO: 43.1 % (ref 36.6–46.6)
HGB BLD-MCNC: 14.41 G/DL (ref 12–15.4)
LYMPHOCYTES # BLD AUTO: 1.64 X10*3/UL (ref 0–6)
LYMPHOCYTES NFR BLD AUTO: 27.4 % (ref 20.5–51.1)
MCH RBC QN AUTO: 29.7 PG (ref 26–32)
MCHC RBC AUTO-ENTMCNC: 33.4 G/DL (ref 31–38)
MCV RBC AUTO: 88.7 FL (ref 80–96)
MONOCYTES # BLD AUTO: 0.4 X10*3/UL (ref 1.6–24.9)
MONOCYTES NFR BLD AUTO: 6.75 % (ref 1.7–9.3)
NEUTROPHILS # BLD AUTO: 3.88 X10*3/UL (ref 1.4–6.5)
NEUTROPHILS NFR BLD AUTO: 64.73 % (ref 42.2–75.2)
PLATELET # BLD AUTO: 217.8 X10*3/UL (ref 150–450)
PMV BLD AUTO: 8.69 FL (ref 7.8–11)
RBC # BLD AUTO: 4.86 X10*6/UL (ref 3.9–5.3)
WBC # BLD AUTO: 5.99 X10*3/UL (ref 4.5–10.5)

## 2024-09-16 PROCEDURE — 80061 LIPID PANEL: CPT | Performed by: INTERNAL MEDICINE

## 2024-09-16 PROCEDURE — 80053 COMPREHEN METABOLIC PANEL: CPT | Performed by: INTERNAL MEDICINE

## 2024-09-16 PROCEDURE — 82306 VITAMIN D 25 HYDROXY: CPT | Performed by: INTERNAL MEDICINE

## 2024-09-16 PROCEDURE — 85025 COMPLETE CBC W/AUTO DIFF WBC: CPT

## 2024-09-16 PROCEDURE — 82607 VITAMIN B-12: CPT | Performed by: INTERNAL MEDICINE

## 2024-09-16 PROCEDURE — 99396 PREV VISIT EST AGE 40-64: CPT | Performed by: INTERNAL MEDICINE

## 2024-09-16 PROCEDURE — 1036F TOBACCO NON-USER: CPT | Performed by: INTERNAL MEDICINE

## 2024-09-16 PROCEDURE — 83036 HEMOGLOBIN GLYCOSYLATED A1C: CPT | Performed by: INTERNAL MEDICINE

## 2024-09-16 RX ORDER — FLUOXETINE 10 MG/1
10 CAPSULE ORAL DAILY
Qty: 30 CAPSULE | Refills: 1 | Status: SHIPPED | OUTPATIENT
Start: 2024-09-16 | End: 2024-11-15

## 2024-09-16 RX ORDER — ONDANSETRON 4 MG/1
4 TABLET, ORALLY DISINTEGRATING ORAL EVERY 8 HOURS PRN
Qty: 20 TABLET | Refills: 0 | Status: SHIPPED | OUTPATIENT
Start: 2024-09-16 | End: 2024-09-23

## 2024-09-16 ASSESSMENT — ENCOUNTER SYMPTOMS
SLEEP DISTURBANCE: 1
NERVOUS/ANXIOUS: 1
PALPITATIONS: 1

## 2024-09-16 ASSESSMENT — PAIN SCALES - GENERAL: PAINLEVEL: 0-NO PAIN

## 2024-09-16 NOTE — PROGRESS NOTES
Subjective   Patient ID: Jessica Brown is a 49 y.o. female who presents for  Annual physical exam.  Stressed, has nausea episodes, depressed.  Feeling worse in the morning, as the day progressed, feels better.    HPI     This is a 49 years old Thai-speaking lady with medical history of anxiety disorder, depression, s/p COVID -19 illness 12/2021.  Patient is  presented for annual physical exam.  She is here for evaluation and treatment of the above complaints.  Has low energy, weak, worse in am.  Patient has ongoing issues with nausea, episodes.   Has L thigh pain, induration, tenderness, due to dog bite.    Review of Systems   Cardiovascular:  Positive for palpitations.   Psychiatric/Behavioral:  Positive for sleep disturbance. The patient is nervous/anxious.        Objective   /78   Pulse 64   Temp 36.3 °C (97.3 °F) (Temporal)   Resp 16   Wt 64 kg (141 lb)   BMI 27.54 kg/m²     Physical Exam  Constitutional:       Appearance: Normal appearance.   HENT:      Head: Normocephalic and atraumatic.      Right Ear: External ear normal.      Left Ear: External ear normal.      Nose: Nose normal.      Mouth/Throat:      Mouth: Mucous membranes are moist.   Eyes:      Extraocular Movements: Extraocular movements intact.      Conjunctiva/sclera: Conjunctivae normal.      Pupils: Pupils are equal, round, and reactive to light.   Cardiovascular:      Rate and Rhythm: Normal rate.      Pulses: Normal pulses.      Heart sounds: Normal heart sounds.   Pulmonary:      Effort: Pulmonary effort is normal.      Breath sounds: Normal breath sounds.   Abdominal:      General: Abdomen is flat. Bowel sounds are normal.      Palpations: Abdomen is soft.   Musculoskeletal:         General: Normal range of motion.      Cervical back: Normal range of motion and neck supple.   Skin:     General: Skin is warm and dry.   Neurological:      General: No focal deficit present.      Mental Status: She is alert.   Psychiatric:          Mood and Affect: Mood normal.         Behavior: Behavior normal.         Thought Content: Thought content normal.         Judgment: Judgment normal.         Assessment/Plan   Problem List Items Addressed This Visit    None  Visit Diagnoses         Codes    Vitamin B12 deficiency    -  Primary E53.8    Relevant Orders    Vitamin B12 (Completed)    Vitamin D deficiency     E55.9    Relevant Orders    Vitamin D 25-Hydroxy,Total (for eval of Vitamin D levels) (Completed)    Annual physical exam     Z00.00    Relevant Orders    Comprehensive Metabolic Panel (Completed)    CBC w/5 Part Differential, [facility] (Completed)    Hemoglobin A1C (Completed)    Lipid Panel (Completed)    Nausea     R11.0    Relevant Medications    ondansetron ODT (Zofran-ODT) 4 mg disintegrating tablet    Anxiety     F41.9    Relevant Medications    FLUoxetine (PROzac) 10 mg capsule    Abdominal pain, unspecified abdominal location     R10.9    Relevant Orders    CT abdomen pelvis wo IV contrast          Annual physical exam.  Fasting blood work.  EKG, urinalysis.  Last mammography  2/5/2024.   Vaccinations.   GYN  was today.    Cyclic vomiting syndrome (Primary)   Take  Amitriptyline, you may increase the dose to 50 mg at night time.   Had EGD evaluation.   s/pEGD gastritis 9/25/2023.  H pylori was not identified.     Anxiety, depression.  Off  (Trintellix) ,  Start on Prozac.     - Headaches,   Improved, CT scan was negative.     - H of COVID -19 illness 12/2021.   Recovered.      -H of Candida laryngitis, esophagitis.  Resolved now.     -Mild vitamin D deficiency.  Take vitamin D daily.     -H of Elevated liver enzymes.  Liver ultrasound.  Repeat Blood work.     - H of Abnormal mammography August 2020.  S/p stereotactic core biopsy lower outer with Top-Hat clip placement 8/25/2020.  Stromal fibrosis and apocrine metaplastic cysts.  Microcalcifications identified.  Repeat mammography in 1 year, referral is placed.     -Overweight with BMI  27. 54  Diet, exercise.  Keep ideal BMI less than 25.  Return in 2 weeks.

## 2024-09-17 VITALS
RESPIRATION RATE: 16 BRPM | WEIGHT: 141 LBS | SYSTOLIC BLOOD PRESSURE: 118 MMHG | DIASTOLIC BLOOD PRESSURE: 78 MMHG | HEART RATE: 64 BPM | BODY MASS INDEX: 27.54 KG/M2 | TEMPERATURE: 97.3 F

## 2024-09-17 LAB
25(OH)D3 SERPL-MCNC: 27 NG/ML (ref 30–100)
ALBUMIN SERPL BCP-MCNC: 4.1 G/DL (ref 3.4–5)
ALP SERPL-CCNC: 40 U/L (ref 45–117)
ALT SERPL W P-5'-P-CCNC: 25 U/L (ref 16–63)
ANION GAP SERPL CALC-SCNC: 15 MMOL/L (ref 10–20)
AST SERPL W P-5'-P-CCNC: 13 U/L (ref 15–37)
BILIRUB SERPL-MCNC: 0.4 MG/DL (ref 0.2–1)
BUN SERPL-MCNC: 12 MG/DL (ref 7–18)
CALCIUM SERPL-MCNC: 8.8 MG/DL (ref 8.5–10.1)
CHLORIDE SERPL-SCNC: 104 MMOL/L (ref 98–107)
CHOLEST SERPL-MCNC: 207 MG/DL (ref 0–199)
CHOLESTEROL/HDL RATIO: 2.8 (ref 4.2–7)
CO2 SERPL-SCNC: 27 MMOL/L (ref 21–32)
CREAT SERPL-MCNC: 0.82 MG/DL (ref 0.6–1.1)
EGFRCR SERPLBLD CKD-EPI 2021: 88 ML/MIN/1.73M*2
GLUCOSE SERPL-MCNC: 97 MG/DL (ref 74–100)
HDLC SERPL-MCNC: 73 MG/DL (ref 40–59)
IS PATIENT FASTING: YES
LDLC SERPL DIRECT ASSAY-MCNC: 112 MG/DL (ref 0–100)
POTASSIUM SERPL-SCNC: 4.3 MMOL/L (ref 3.5–5.1)
PROT SERPL-MCNC: 7 G/DL (ref 6.4–8.2)
SODIUM SERPL-SCNC: 142 MMOL/L (ref 136–145)
TRIGL SERPL-MCNC: 68 MG/DL
VIT B12 SERPL-MCNC: 569 PG/ML (ref 193–986)

## 2025-01-02 ENCOUNTER — PREP FOR PROCEDURE (OUTPATIENT)
Dept: ORTHOPEDIC SURGERY | Facility: HOSPITAL | Age: 50
End: 2025-01-02

## 2025-01-02 ENCOUNTER — OFFICE VISIT (OUTPATIENT)
Dept: ORTHOPEDIC SURGERY | Facility: HOSPITAL | Age: 50
End: 2025-01-02
Payer: COMMERCIAL

## 2025-01-02 VITALS — WEIGHT: 140 LBS | HEIGHT: 60 IN | BODY MASS INDEX: 27.48 KG/M2

## 2025-01-02 DIAGNOSIS — M65.4 DE QUERVAIN'S TENOSYNOVITIS, LEFT: ICD-10-CM

## 2025-01-02 DIAGNOSIS — M65.4 DE QUERVAIN'S TENOSYNOVITIS, LEFT: Primary | ICD-10-CM

## 2025-01-02 PROCEDURE — 99214 OFFICE O/P EST MOD 30 MIN: CPT | Performed by: ORTHOPAEDIC SURGERY

## 2025-01-02 PROCEDURE — 1036F TOBACCO NON-USER: CPT | Performed by: ORTHOPAEDIC SURGERY

## 2025-01-02 PROCEDURE — 3008F BODY MASS INDEX DOCD: CPT | Performed by: ORTHOPAEDIC SURGERY

## 2025-01-02 ASSESSMENT — PAIN SCALES - GENERAL: PAINLEVEL_OUTOF10: 6

## 2025-01-02 ASSESSMENT — PAIN - FUNCTIONAL ASSESSMENT: PAIN_FUNCTIONAL_ASSESSMENT: 0-10

## 2025-01-02 NOTE — H&P (VIEW-ONLY)
49 y.o. female presents today for follow-up of  left radial sided wrist pain. The patient reports  symptoms for several months getting worse. Pain is controlled. Patient reports no numbness and tingling.  Reports no previous surgeries or trauma to the area.  Reports pain worse with use, better at rest.   Pain dull ache, sharp at times.    worse picking things up and lifting things.  She states she had a similar issue on her right side that underwent shots and eventual surgery for continued pain.  We did a shot that helped for about 2 months or so and then symptoms returned.  She would like to have surgery on the left now    Review of Systems    Constitutional: see HPI, no fever, no chills, not feeling tired, no significant weight gain or weight loss.   Eyes: No vision changes  ENT: no nosebleeds.   Cardiovascular: no chest pain.   Respiratory: no shortness of breath and no cough.   Gastrointestinal: no abdominal pain, no nausea, no vomiting and no diarrhea.   Musculoskeletal: per HPI  Neurological: no headache, no gait disturbances  Psychiatric: no depression and no sleep disturbances.   Endocrine: no muscle weakness and no muscle cramps.   Hematologic/Lymphatic: no swollen glands and no tendency for easy bruising or excessive swelling.     Patient's past medical history, past surgical history, allergies, and medications have been reviewed unless otherwise noted in the chart.     DeQuervain's Exam:  Inspection:  no evidence of infection, no edema, no erythema, no ecchymosis, ulnar styloid mass about 1 cm x 1 cm palpation:  compartments are soft, pain with palpation over the radial wrist, Range of Motion:  full wrist and finger range of motion, Stability:  no wrist or finger instability detected, Strength:  5/5  and strength, Skin:  intact, Vascular:  capillary refill <2 seconds distally, Sensation:  intact to light touch distally, Test:  positive Finkelstein's, negative Thumb CMC Grind.      Constitutional    General appearance: Alert and in no acute distress. Well developed, well nourished.    Eyes   External Eye, Conjunctiva and lids: Normal external exam - pupils were equal in size, round, reactive to light (PERRL) with normal accommodation and extraocular movements intact (EOMI).   Ears, Nose, Mouth, and Throat   Hearing: Normal.   Neck   Neck: No neck mass was observed. Supple.   Pulmonary   Respiratory effort: No respiratory distress.   Cardiovascular   Examination of extremities: No peripheral edema.   Psychiatric   Judgment and insight: Intact.   Orientation to person, place, and time: Alert and oriented x 3.       Mood and affect: Normal.      Left de Quervain's tenosynovitis  Surgery discussion: I discussed the diagnosis and treatment options with the patient today along with their associated risks and benefits. After thorough discussion, the patient has elected to proceed with surgical intervention. The patient understands the risks of,including, but not limited to, bleeding, infection, loss of life or limb, pain, permanent numbness, tingling, weakness, loss of motion, failure of the goals of surgery or the potential need for additional surgery. The patient would like to accept these risks and proceed. All questions were answered to the patients satisfaction and the patient seems satisfied with the plan.    Plan left first dorsal compartment release  Follow-up 2 weeks after no x-ray

## 2025-01-02 NOTE — PROGRESS NOTES
De Quervain's tenosynovitis, left. Last injected 6/13/2024. Patient got some relief from injection, lasted 2months. Started to feel pain back again in October. Patient wants to discuss other options.

## 2025-01-06 ENCOUNTER — ANESTHESIA EVENT (OUTPATIENT)
Dept: OPERATING ROOM | Facility: HOSPITAL | Age: 50
End: 2025-01-06
Payer: COMMERCIAL

## 2025-01-07 ENCOUNTER — HOSPITAL ENCOUNTER (OUTPATIENT)
Facility: HOSPITAL | Age: 50
Setting detail: OUTPATIENT SURGERY
Discharge: HOME | End: 2025-01-07
Attending: ORTHOPAEDIC SURGERY | Admitting: ORTHOPAEDIC SURGERY
Payer: COMMERCIAL

## 2025-01-07 ENCOUNTER — PHARMACY VISIT (OUTPATIENT)
Dept: PHARMACY | Facility: CLINIC | Age: 50
End: 2025-01-07
Payer: COMMERCIAL

## 2025-01-07 ENCOUNTER — ANESTHESIA (OUTPATIENT)
Dept: OPERATING ROOM | Facility: HOSPITAL | Age: 50
End: 2025-01-07
Payer: COMMERCIAL

## 2025-01-07 VITALS
DIASTOLIC BLOOD PRESSURE: 68 MMHG | HEART RATE: 66 BPM | RESPIRATION RATE: 12 BRPM | OXYGEN SATURATION: 96 % | SYSTOLIC BLOOD PRESSURE: 107 MMHG | TEMPERATURE: 97.1 F

## 2025-01-07 DIAGNOSIS — M65.4 DE QUERVAIN'S TENOSYNOVITIS, LEFT: Primary | ICD-10-CM

## 2025-01-07 LAB — PREGNANCY TEST URINE, POC: NEGATIVE

## 2025-01-07 PROCEDURE — 3700000002 HC GENERAL ANESTHESIA TIME - EACH INCREMENTAL 1 MINUTE: Performed by: ORTHOPAEDIC SURGERY

## 2025-01-07 PROCEDURE — 3600000003 HC OR TIME - INITIAL BASE CHARGE - PROCEDURE LEVEL THREE: Performed by: ORTHOPAEDIC SURGERY

## 2025-01-07 PROCEDURE — 2500000004 HC RX 250 GENERAL PHARMACY W/ HCPCS (ALT 636 FOR OP/ED): Mod: JZ | Performed by: ORTHOPAEDIC SURGERY

## 2025-01-07 PROCEDURE — 25076 EXC FOREARM TUM DEEP < 3 CM: CPT | Performed by: ORTHOPAEDIC SURGERY

## 2025-01-07 PROCEDURE — 7100000009 HC PHASE TWO TIME - INITIAL BASE CHARGE: Performed by: ORTHOPAEDIC SURGERY

## 2025-01-07 PROCEDURE — 2720000007 HC OR 272 NO HCPCS: Performed by: ORTHOPAEDIC SURGERY

## 2025-01-07 PROCEDURE — 3600000008 HC OR TIME - EACH INCREMENTAL 1 MINUTE - PROCEDURE LEVEL THREE: Performed by: ORTHOPAEDIC SURGERY

## 2025-01-07 PROCEDURE — 7100000010 HC PHASE TWO TIME - EACH INCREMENTAL 1 MINUTE: Performed by: ORTHOPAEDIC SURGERY

## 2025-01-07 PROCEDURE — 2500000004 HC RX 250 GENERAL PHARMACY W/ HCPCS (ALT 636 FOR OP/ED)

## 2025-01-07 PROCEDURE — 88304 TISSUE EXAM BY PATHOLOGIST: CPT | Mod: TC,PORLAB | Performed by: ORTHOPAEDIC SURGERY

## 2025-01-07 PROCEDURE — 2500000004 HC RX 250 GENERAL PHARMACY W/ HCPCS (ALT 636 FOR OP/ED): Performed by: ANESTHESIOLOGY

## 2025-01-07 PROCEDURE — RXMED WILLOW AMBULATORY MEDICATION CHARGE

## 2025-01-07 PROCEDURE — 3700000001 HC GENERAL ANESTHESIA TIME - INITIAL BASE CHARGE: Performed by: ORTHOPAEDIC SURGERY

## 2025-01-07 PROCEDURE — 2500000005 HC RX 250 GENERAL PHARMACY W/O HCPCS: Performed by: ORTHOPAEDIC SURGERY

## 2025-01-07 PROCEDURE — 81025 URINE PREGNANCY TEST: CPT | Performed by: ANESTHESIOLOGY

## 2025-01-07 PROCEDURE — 2500000004 HC RX 250 GENERAL PHARMACY W/ HCPCS (ALT 636 FOR OP/ED): Performed by: ORTHOPAEDIC SURGERY

## 2025-01-07 PROCEDURE — 25000 INCISION OF TENDON SHEATH: CPT | Performed by: ORTHOPAEDIC SURGERY

## 2025-01-07 RX ORDER — HYDRALAZINE HYDROCHLORIDE 20 MG/ML
5 INJECTION INTRAMUSCULAR; INTRAVENOUS EVERY 30 MIN PRN
Status: DISCONTINUED | OUTPATIENT
Start: 2025-01-07 | End: 2025-01-07 | Stop reason: HOSPADM

## 2025-01-07 RX ORDER — LABETALOL HYDROCHLORIDE 5 MG/ML
5 INJECTION, SOLUTION INTRAVENOUS ONCE AS NEEDED
Status: DISCONTINUED | OUTPATIENT
Start: 2025-01-07 | End: 2025-01-07 | Stop reason: HOSPADM

## 2025-01-07 RX ORDER — DIPHENHYDRAMINE HYDROCHLORIDE 50 MG/ML
12.5 INJECTION INTRAMUSCULAR; INTRAVENOUS ONCE AS NEEDED
Status: DISCONTINUED | OUTPATIENT
Start: 2025-01-07 | End: 2025-01-07 | Stop reason: HOSPADM

## 2025-01-07 RX ORDER — SODIUM CHLORIDE, SODIUM LACTATE, POTASSIUM CHLORIDE, CALCIUM CHLORIDE 600; 310; 30; 20 MG/100ML; MG/100ML; MG/100ML; MG/100ML
100 INJECTION, SOLUTION INTRAVENOUS CONTINUOUS
Status: DISCONTINUED | OUTPATIENT
Start: 2025-01-07 | End: 2025-01-07 | Stop reason: HOSPADM

## 2025-01-07 RX ORDER — MIDAZOLAM HYDROCHLORIDE 1 MG/ML
INJECTION, SOLUTION INTRAMUSCULAR; INTRAVENOUS AS NEEDED
Status: DISCONTINUED | OUTPATIENT
Start: 2025-01-07 | End: 2025-01-07

## 2025-01-07 RX ORDER — ONDANSETRON HYDROCHLORIDE 2 MG/ML
4 INJECTION, SOLUTION INTRAVENOUS ONCE AS NEEDED
Status: DISCONTINUED | OUTPATIENT
Start: 2025-01-07 | End: 2025-01-07 | Stop reason: HOSPADM

## 2025-01-07 RX ORDER — LIDOCAINE HYDROCHLORIDE 10 MG/ML
0.1 INJECTION, SOLUTION EPIDURAL; INFILTRATION; INTRACAUDAL; PERINEURAL ONCE
Status: DISCONTINUED | OUTPATIENT
Start: 2025-01-07 | End: 2025-01-07 | Stop reason: HOSPADM

## 2025-01-07 RX ORDER — DROPERIDOL 2.5 MG/ML
0.62 INJECTION, SOLUTION INTRAMUSCULAR; INTRAVENOUS ONCE AS NEEDED
Status: DISCONTINUED | OUTPATIENT
Start: 2025-01-07 | End: 2025-01-07 | Stop reason: HOSPADM

## 2025-01-07 RX ORDER — MORPHINE SULFATE 2 MG/ML
2 INJECTION, SOLUTION INTRAMUSCULAR; INTRAVENOUS EVERY 5 MIN PRN
Status: DISCONTINUED | OUTPATIENT
Start: 2025-01-07 | End: 2025-01-07 | Stop reason: HOSPADM

## 2025-01-07 RX ORDER — BUPIVACAINE HCL/EPINEPHRINE 0.5-1:200K
VIAL (ML) INJECTION AS NEEDED
Status: DISCONTINUED | OUTPATIENT
Start: 2025-01-07 | End: 2025-01-07 | Stop reason: HOSPADM

## 2025-01-07 RX ORDER — PROPOFOL 10 MG/ML
INJECTION, EMULSION INTRAVENOUS AS NEEDED
Status: DISCONTINUED | OUTPATIENT
Start: 2025-01-07 | End: 2025-01-07

## 2025-01-07 RX ORDER — ALBUTEROL SULFATE 0.83 MG/ML
2.5 SOLUTION RESPIRATORY (INHALATION) ONCE AS NEEDED
Status: DISCONTINUED | OUTPATIENT
Start: 2025-01-07 | End: 2025-01-07 | Stop reason: HOSPADM

## 2025-01-07 RX ORDER — FENTANYL CITRATE 50 UG/ML
INJECTION, SOLUTION INTRAMUSCULAR; INTRAVENOUS AS NEEDED
Status: DISCONTINUED | OUTPATIENT
Start: 2025-01-07 | End: 2025-01-07

## 2025-01-07 RX ORDER — OXYCODONE AND ACETAMINOPHEN 5; 325 MG/1; MG/1
1 TABLET ORAL EVERY 4 HOURS PRN
Status: DISCONTINUED | OUTPATIENT
Start: 2025-01-07 | End: 2025-01-07 | Stop reason: HOSPADM

## 2025-01-07 RX ORDER — FAMOTIDINE 10 MG/ML
20 INJECTION INTRAVENOUS ONCE
Status: COMPLETED | OUTPATIENT
Start: 2025-01-07 | End: 2025-01-07

## 2025-01-07 RX ORDER — LIDOCAINE HYDROCHLORIDE AND EPINEPHRINE 10; 20 UG/ML; MG/ML
INJECTION, SOLUTION INFILTRATION; PERINEURAL AS NEEDED
Status: DISCONTINUED | OUTPATIENT
Start: 2025-01-07 | End: 2025-01-07 | Stop reason: HOSPADM

## 2025-01-07 RX ORDER — CEFAZOLIN SODIUM 2 G/100ML
2 INJECTION, SOLUTION INTRAVENOUS ONCE
Status: COMPLETED | OUTPATIENT
Start: 2025-01-07 | End: 2025-01-07

## 2025-01-07 RX ORDER — HYDROCODONE BITARTRATE AND ACETAMINOPHEN 5; 325 MG/1; MG/1
1 TABLET ORAL EVERY 6 HOURS PRN
Qty: 15 TABLET | Refills: 0 | Status: SHIPPED | OUTPATIENT
Start: 2025-01-07

## 2025-01-07 RX ADMIN — CEFAZOLIN SODIUM 2 G: 2 INJECTION, SOLUTION INTRAVENOUS at 11:30

## 2025-01-07 RX ADMIN — FENTANYL CITRATE 50 MCG: 50 INJECTION INTRAMUSCULAR; INTRAVENOUS at 11:28

## 2025-01-07 RX ADMIN — PROPOFOL 50 MG: 10 INJECTION, EMULSION INTRAVENOUS at 11:28

## 2025-01-07 RX ADMIN — FAMOTIDINE 20 MG: 10 INJECTION, SOLUTION INTRAVENOUS at 10:44

## 2025-01-07 RX ADMIN — MIDAZOLAM 2 MG: 1 INJECTION INTRAMUSCULAR; INTRAVENOUS at 11:24

## 2025-01-07 RX ADMIN — SODIUM CHLORIDE: 9 INJECTION, SOLUTION INTRAVENOUS at 11:22

## 2025-01-07 SDOH — HEALTH STABILITY: MENTAL HEALTH: CURRENT SMOKER: 0

## 2025-01-07 ASSESSMENT — COLUMBIA-SUICIDE SEVERITY RATING SCALE - C-SSRS
1. IN THE PAST MONTH, HAVE YOU WISHED YOU WERE DEAD OR WISHED YOU COULD GO TO SLEEP AND NOT WAKE UP?: NO
2. HAVE YOU ACTUALLY HAD ANY THOUGHTS OF KILLING YOURSELF?: NO
6. HAVE YOU EVER DONE ANYTHING, STARTED TO DO ANYTHING, OR PREPARED TO DO ANYTHING TO END YOUR LIFE?: NO

## 2025-01-07 ASSESSMENT — PAIN SCALES - GENERAL
PAINLEVEL_OUTOF10: 0 - NO PAIN
PAINLEVEL_OUTOF10: 5 - MODERATE PAIN
PAINLEVEL_OUTOF10: 0 - NO PAIN
PAIN_LEVEL: 0
PAINLEVEL_OUTOF10: 0 - NO PAIN

## 2025-01-07 ASSESSMENT — PAIN - FUNCTIONAL ASSESSMENT
PAIN_FUNCTIONAL_ASSESSMENT: 0-10

## 2025-01-07 NOTE — ANESTHESIA PREPROCEDURE EVALUATION
Patient: Jessica Brown    Procedure Information       Date/Time: 01/07/25 1103    Procedure: left 1st dorsal compartment release with mass excision on wrist (mac/local) (Left: Hand) - loacl/mac 30 minutes 2g ancef no special    Location: [facility] 07 / Virtual [facility]    Surgeons: Jimmy Pollard, DO            Relevant Problems   Anesthesia (within normal limits)       Clinical information reviewed:   Tobacco  Allergies  Meds   Med Hx  Surg Hx  OB Status  Fam Hx  Soc   Hx        NPO Detail:  No data recorded     Physical Exam    Airway  Mallampati: II  TM distance: >3 FB  Neck ROM: full     Cardiovascular - normal exam     Dental - normal exam     Pulmonary - normal exam     Abdominal - normal exam         Anesthesia Plan    History of general anesthesia?: yes  History of complications of general anesthesia?: no    ASA 1     MAC     The patient is not a current smoker.    intravenous induction   Anesthetic plan and risks discussed with patient.    Plan discussed with CRNA.

## 2025-01-07 NOTE — OP NOTE
ORTHOPEDIC OPERATIVE NOTE    Name: Jessica Brown  : 1975  Surgeon: Talon Pollard DO  Facility: [facility]  Date of Surgery: 25     SURGEON:    Talon Pollard DO  ASSISTANT:    None  PRE OP DIAGNOSIS:  Left DeQuervain's tenosynovitis with mass  POST OP DIAGNOSIS:  Same  PROCEDURE:   Left first dorsal compartment release with mass excision  ANESTHESIA:   Local with IV Sedation  BLOOD LOSS: Minimal  Specimen: Left wrist mass (ganglion cyst)       PROCEDURE: : Patient was taken back to the operative suite.   IV sedation was administered by the Department of Anesthesia and I then injected local around the area of planned incision using combination Xylocaine and Marcaine  totaling 10 cc. The hand was prepped and draped in the usual manner. The procedure was performed under tourniquet control.  An esmarch was used to exsanguinate the extremity and tourniquet inflated to 250 mm Hg.      A longitudinal incision was made over the radial styloid. Dissection was taken down to the first dorsal copmartment using Littler's scissors. Blunt dissection was used to identify its margins. The neurovascular bundle identified and gently retracted.    Mass was identified on the superficial aspect of the first dorsal compartment and excised with a scalpel.  It was sent to pathology for examination.    The first dorsal compartment was incised with a scalpel.  It was released both proximally and distally with littler's scissors.  The tendons were retracted out confirming adequate release.  There was no additional septum or subsheath, however the compartment tissues were very cystic and there was abundant fluid in the compartment    The tourniquet was released. The wound was irrigated. Hemostasis satisfactory. The skin was closed with interrupted 5-0 Nylon suture. Soft bulky dressing was applied. The patient was taken to the recovery room in satisfactory condition.     Electronically signed  Talon Pollard DO

## 2025-01-07 NOTE — ANESTHESIA POSTPROCEDURE EVALUATION
Patient: Jessica Brown    Procedure Summary       Date: 01/07/25 Room / Location: [facility] / [facility]    Anesthesia Start: 1124 Anesthesia Stop: 1155    Procedure: left 1st dorsal compartment release with mass excision on wrist (mac/local) (Left: Hand) Diagnosis:       De Quervain's tenosynovitis, left      (De Quervain's tenosynovitis, left [M65.4])    Surgeons: Jimmy Pollard DO Responsible Provider: SERGIO Alfonso    Anesthesia Type: MAC ASA Status: 1            Anesthesia Type: MAC    Vitals Value Taken Time   /68 01/07/25 1230   Temp 36.2 °C (97.1 °F) 01/07/25 1230   Pulse 66 01/07/25 1230   Resp 12 01/07/25 1230   SpO2 96 % 01/07/25 1230       Anesthesia Post Evaluation    Patient location during evaluation: PACU  Patient participation: complete - patient participated  Level of consciousness: awake  Pain score: 0  Pain management: adequate  Airway patency: patent  Cardiovascular status: acceptable  Respiratory status: acceptable  Hydration status: acceptable  Postoperative Nausea and Vomiting: none    There were no known notable events for this encounter.

## 2025-01-07 NOTE — DISCHARGE INSTRUCTIONS
[facility]  [phone]   Fax: [phone] [address]    [address] [facility]  [address]  [place] [id number] [place] [id number]    Upper Extremity   Post-Operatively (After Surgery) -first dorsal compartment release for de Quervain's tenosynovitis with mass excision    1. Post-Operative Course    Following surgery, your surgeon will see your family in the family call room. Once stable, and in the Post Anesthesia Care Unit (PACU), you will be transported to your hospital room or allowed to go home if an outpatient procedure.     2. Dressing    You have a splint (hard casting material), do NOT remove the dressing until follow up. DO NOT GET DRESSING WET! Once at home, if your dressing, splint, or cast feels too tight or mistakenly gets wet, please contact the office. Further dressing instructions may be given at surgery.     If your sutures are visible (black strings), they will be removed about 10-14 days after surgery.  If you do not see any sutures, then they are absorbable and will not need to be removed.  In either case, you should have an appointment to see your physician 10-14 after surgery.    3. Activity     Most people underestimate the length of time required to fully recover from surgery. If you had a block (where your arm feels numb), the sensation typically returns around 18-24 hours later.  It is recommended you take some pain medication the evening following your surgery so when the block wears off (in the middle of the night), you are not in severe pain.   With pain medication, start at the lowest dose that controls your pain.       After the first 1-3 days, we encourage you to balance your activity between ambulation, sitting in a chair,   and resting in bed with your arm elevated. Let swelling and pain be your guide to activity, you should   avoid prolonged (over 20 minutes) standing or sitting. In general, limit your activities to home for  the first   1-3 days.    4. Pain    You will be discharged to home with a prescription for oral pain medication. A most important factor in pain control is rest and elevation. Ice may also be applied to the operative site for 30 minute intervals. Please use a waterproof bag for ice or cold packs.  Again, as you feel the numbness resolving and some onset of discomfort, please take pain medication, don't wait till full resolution of block.   Try to use the lowest dose of pain medication that controls your pain.      The pain medication may cause constipation. Drink plenty of fluids, eat fruits and vegetables. You may use an over the counter laxative or stool softener if necessary. Take pain medication with some food in your stomach, as prescribed by your pharmacist.     5. Elevation   Elevation of the operative extremity is critical. Elevation reduces swelling and minimizes pain. Less swelling is associated with a lower infectious rate, fewer wound complications, less post-operative stiffness, and more rapid recovery of function. To keep the swelling down, your hand must be kept above the level of your heart.

## 2025-01-08 ASSESSMENT — PAIN SCALES - GENERAL: PAINLEVEL_OUTOF10: 8

## 2025-01-13 DIAGNOSIS — M65.4 DE QUERVAIN'S TENOSYNOVITIS, LEFT: Primary | ICD-10-CM

## 2025-01-13 LAB
LABORATORY COMMENT REPORT: NORMAL
PATH REPORT.FINAL DX SPEC: NORMAL
PATH REPORT.GROSS SPEC: NORMAL
PATH REPORT.RELEVANT HX SPEC: NORMAL
PATH REPORT.TOTAL CANCER: NORMAL

## 2025-01-13 RX ORDER — TRAMADOL HYDROCHLORIDE 50 MG/1
50 TABLET ORAL EVERY 6 HOURS PRN
Qty: 15 TABLET | Refills: 0 | Status: SHIPPED | OUTPATIENT
Start: 2025-01-13 | End: 2025-01-20

## 2025-01-20 ENCOUNTER — APPOINTMENT (OUTPATIENT)
Dept: ORTHOPEDIC SURGERY | Facility: HOSPITAL | Age: 50
End: 2025-01-20
Payer: COMMERCIAL

## 2025-01-22 ENCOUNTER — APPOINTMENT (OUTPATIENT)
Dept: ORTHOPEDIC SURGERY | Facility: CLINIC | Age: 50
End: 2025-01-22
Payer: COMMERCIAL

## 2025-01-22 VITALS — BODY MASS INDEX: 24.84 KG/M2 | WEIGHT: 135 LBS | HEIGHT: 62 IN

## 2025-01-22 DIAGNOSIS — M65.4 DE QUERVAIN'S TENOSYNOVITIS, LEFT: Primary | ICD-10-CM

## 2025-01-22 DIAGNOSIS — M65.4 DE QUERVAIN'S TENOSYNOVITIS, LEFT: ICD-10-CM

## 2025-01-22 PROCEDURE — 1036F TOBACCO NON-USER: CPT | Performed by: ORTHOPAEDIC SURGERY

## 2025-01-22 PROCEDURE — 3008F BODY MASS INDEX DOCD: CPT | Performed by: ORTHOPAEDIC SURGERY

## 2025-01-22 PROCEDURE — 99024 POSTOP FOLLOW-UP VISIT: CPT | Performed by: ORTHOPAEDIC SURGERY

## 2025-01-22 ASSESSMENT — PAIN - FUNCTIONAL ASSESSMENT: PAIN_FUNCTIONAL_ASSESSMENT: 0-10

## 2025-01-22 ASSESSMENT — PAIN SCALES - GENERAL: PAINLEVEL_OUTOF10: 6

## 2025-01-22 NOTE — PROGRESS NOTES
49 y.o. female presents today for for follow up after left first dorsal compartment release with mass excision. The patient reports symptoms improving but does still have pain. The DOS was 2 weeks ago. Pain is controlled. Patient denies numbness and tingling except top of thumb and index finger decreased sensation.  Splint has been worn as directed.       Review of Systems    Constitutional: see HPI, no fever, no chills, not feeling tired, no significant weight gain or weight loss.   Eyes: No vision changes  ENT: no nosebleeds.   Cardiovascular: no chest pain.   Respiratory: no shortness of breath and no cough.   Gastrointestinal: no abdominal pain, no nausea, no vomiting and no diarrhea.   Musculoskeletal: per HPI  Neurological: no headache, no gait disturbances  Psychiatric: no depression and no sleep disturbances.   Endocrine: no muscle weakness and no muscle cramps.   Hematologic/Lymphatic: no swollen glands and no tendency for easy bruising or excessive swelling.     Patient's past medical history, past surgical history, allergies, and medications have been reviewed unless otherwise noted in the chart.     Hand/Wrist Post-Op Exam  Inspection:  no evidence of infection, no erythema, Palpation:  compartments are soft, Range of Motion:  F/E 80/80, S/P 90/90 Finger ROM Full extension, full flexion Stability:  stable Strength:  5/5 F/E, 5/5 Adduction/Abduction 5/5 Opposition Skin:  incision site clean, dry and intact, healing without complication, Vascular:  capillary refill <2 seconds distally, Sensation:  intact to light touch distally.    Constitutional   General appearance: Alert and in no acute distress. Well developed, well nourished.    Eyes   External Eye, Conjunctiva and lids: Normal external exam - pupils were equal in size, round, reactive to light (PERRL) with normal accommodation and extraocular movements intact (EOMI).   Ears, Nose, Mouth, and Throat   Hearing: Normal.   Neck   Neck: No neck mass was  observed. Supple.   Pulmonary   Respiratory effort: No respiratory distress.   Cardiovascular   Examination of extremities: No peripheral edema.   Psychiatric   Judgment and insight: Intact.   Orientation to person, place, and time: Alert and oriented x 3.       Mood and affect: Normal.      Pathology shows ganglion cyst    2 weeks status post left wrist first dorsal compartment release with mass excision  Based on the history, physical exam and imaging studies above, the patient's presentation is consistent with the above diagnosis.  I had a long discussion with the patient regarding their presentation and the treatment options.    We again discussed her treatment options going forward along with their associated risks and benefits. After thorough discussion, the patient has elected to proceed with postoperative care. All questions were answered to the patients satisfaction who seems satisfied with the plan.  They will call the office with any questions/concerns.     Sutures removed  Steris  Vitamin E cream prn to scar tissue  Activities as tolerated  Occupational Therapy evaluation and treatment  FU 4 weeks - no XR

## 2025-01-22 NOTE — PROGRESS NOTES
2 weeks PO S/P left 1st dorsal compartment release with mass excision on wris  done 1/7/2025. Pain is well controlled with the current treatment plan. Reports numbness/tingling in digits 1-2. They overall are doing well. They have concerns with he numbness and pain.

## 2025-01-27 ENCOUNTER — DOCUMENTATION (OUTPATIENT)
Dept: OCCUPATIONAL THERAPY | Facility: CLINIC | Age: 50
End: 2025-01-27
Payer: COMMERCIAL

## 2025-01-27 NOTE — PROGRESS NOTES
Occupational Therapy                 Therapy Communication Note    Patient Name: Jessica Brown  MRN: [id number]  Today's Date: 1/27/2025     Discipline: Occupational Therapy    Missed Visit Reason:  No show to evaluation. Called pt, sent to voicemail.     Missed Time: No Show

## 2025-02-05 ENCOUNTER — EVALUATION (OUTPATIENT)
Dept: OCCUPATIONAL THERAPY | Facility: CLINIC | Age: 50
End: 2025-02-05
Payer: COMMERCIAL

## 2025-02-05 DIAGNOSIS — M25.642 JOINT STIFFNESS OF HAND, LEFT: ICD-10-CM

## 2025-02-05 DIAGNOSIS — M79.642 HAND PAIN, LEFT: Primary | ICD-10-CM

## 2025-02-05 DIAGNOSIS — M65.4 DE QUERVAIN'S TENOSYNOVITIS, LEFT: ICD-10-CM

## 2025-02-05 DIAGNOSIS — R29.898 LEFT HAND WEAKNESS: ICD-10-CM

## 2025-02-05 PROCEDURE — 97165 OT EVAL LOW COMPLEX 30 MIN: CPT | Mod: GO

## 2025-02-05 PROCEDURE — 97530 THERAPEUTIC ACTIVITIES: CPT | Mod: GO

## 2025-02-05 PROCEDURE — 97110 THERAPEUTIC EXERCISES: CPT | Mod: GO

## 2025-02-05 ASSESSMENT — PAIN - FUNCTIONAL ASSESSMENT: PAIN_FUNCTIONAL_ASSESSMENT: 0-10

## 2025-02-05 ASSESSMENT — ENCOUNTER SYMPTOMS
LOSS OF SENSATION IN FEET: 0
OCCASIONAL FEELINGS OF UNSTEADINESS: 0
DEPRESSION: 0

## 2025-02-05 ASSESSMENT — PAIN SCALES - GENERAL: PAINLEVEL_OUTOF10: 7

## 2025-02-05 NOTE — PROGRESS NOTES
Occupational Therapy    Evaluation/Treatment    Patient Name: Jessica Brown  MRN: [id number]  : 1975  Today's Date: 25     Time Calculation  Start Time: 1445  Stop Time: 1530  Time Calculation (min): 45 min  Therapeutic Procedure Codes:  OT Evaluation Time Entry  OT Evaluation (Low) Time Entry: 15   OT Therapeutic Procedures Time Entry  Therapeutic Activity Time Entry: 15  Therapeutic Exercise Time Entry: 15                         Subjective   Current Problem:  1. Hand pain, left        2. De Quervain's tenosynovitis, left  Referral to Occupational Therapy    Follow Up In Occupational Therapy      3. Left hand weakness        4. Joint stiffness of hand, left          General: Pt is 4 weeks s/p 1st dorsal compartment release and mass excision (DOS: 25). Pt states that she has been experiencing severe pain along thumb which radiates towards arm. Pt believes this is d/t her returning to work too soon. Pt is self-employed as a seamstress. Pt states that her symptoms began around a year ago. Had no relief from steroid injections. Therefore, elected to proceed with surgical management with Dr. Pollard. Pt takes natural supplements for pain relief.    Presented to therapy in OTC wrist cock-up brace.          OT Received On: 25  General  Reason for Referral: hand pain L  Referred By: Dr. Pollard      Hand Dominance: R handed/L injury   Precautions:  STEADI Fall Risk Score (The score of 4 or more indicates an increased risk of falling): 0  Braces Applied: OTC wrist cock up L     I have reviewed patients medical history form.   Pain:   Pain Assessment  Pain Assessment: 0-10  0-10 (Numeric) Pain Score: 7  Pain Type: Acute pain  Pain Location: Hand  Pain Orientation: Left  Pain Radiating Towards: forearm  Pain Descriptors: Numbness, Pins and needles, Sharp, Shooting  Pain Frequency: Intermittent    Objective      Home Living: lives with , daughter and mother      Prior Function: independent;  self-employed as a seamstress.       ADL/IADL: completing tasks MOD I. Has pain with all tasks. She is currently working, however adjusts her arm for comfort while working. She is trying to work less to rest her arm, however she is finding it difficult.        Therapy/Activity:    DOS: 1/07/25  Post-op: 4 weeks        Exercises: Reps:   AROM Wrist flex/ext, RD/UD, pro/sup   Thumb all positions   1 x 3 reps, hold 5 sec each                         HEP provided to pt on 2/5/25 HEP provided to pt including hand and wrist . Pt instructed to complete 4-8x a day, 3-5 reps within pain free range. Handouts provided to pt.    Activities:    Edema mgmt Fitted pt with tubi  E to assist with reducing edema about L wrist. Instructed pt in its care and use.     Edu pt on various edema mgmt techniques including icing and elevating     Scar mgmt Instructed pt in scar mgmt techniques including STM (circles, zigzags, drag and pulls) with Vitamin E or substitute.      Applied 50/50 mix to scar to assist with flattening and softening scar.            Modalities:                    Manual:                    Functional review:     Completed on: 2/5/25 Evaluation      Measurements:   Thumb Measurements:   MP IP DPC R Abd P Abd   Right +30/ +20/ 0 75 85   Left +10/50 +20/52 4 45 50      Wrist Measurements:  WFL unless documented below   Flexion  Extension Radial Dev Ulnar Dev Supination Pronation   Right 75 65 30 25 85 90   Left 30 40 15 15 70 80      Sensation: numbness at L thumb and IF. Thumb is constant, IF has improved. Intermittent paraesthesia which radiates from thumb to arm to chest.         Outcome Measures:   Quickdash Scores: will obtain at next session.     OP EDUCATION:  Education  Individual(s) Educated: Patient  Education Provided: Anatomy & Physiology, Diagnosis & Precautions, Risk and benefits of OT discussed with patient or other, POC discussed and agreed upon, Edema control  Home Program: AROM, Edema control,  Handout issued  Risk and Benefits Discussed with Patient/Caregiver/Other: yes  Patient/Caregiver Demonstrated Understanding: yes  Plan of Care Discussed and Agreed Upon: yes  Patient Response to Education: Patient/Caregiver Verbalized Understanding of Information, Patient/Caregiver Performed Return Demonstration of Exercises/Activities, Patient/Caregiver Asked Appropriate Questions    Assessment:  Pt is a 49 year old female who presents to this facility with performance deficits in L wrist, thumb mobility, pain and strength limiting ability to complete ADL and IADL tasks efficiently. Pt  provided with HEP including wrist and thumb AROM, scar massage and desensitization, and edema management. Pt demo'd moderate sensitivity about scar. Handouts provided to pt. Pt demonstrated understanding. Pt had frequent occurences of shooting pain throughout session. Instructed pt to complete within pain tolerance as to limit exacerbation of symptoms.         Plan:     OT Plan: Occupational therapy intervention plan to include education/instruction, electrical stimulation, hot pack, ultrasound, manual therapy,  orthotic fitting/training, self-care/home management, therapeutic exercises, therapeutic activities, and home program.  Frequency: 1x a week  Duration: 8-10x weeks    Goals:  Active       OT Goals       OT Goal 1       Start:  02/05/25    Expected End:  05/05/25       LTG - Patient will indicate/ demonstrate the ability to resume all preinjury ADLs and IADLs without significant limits secondary to decreased ROM, decreased strength and/or pain as indicated by Quickdash score of less than 30%.          OT Goal 2       Start:  02/05/25    Expected End:  05/05/25       Develop and issue HEP to help maximize ROM, strength and tolerance to help maximize return to all pre-onset activities.          OT Goal 3       Start:  02/05/25    Expected End:  05/05/25       Patient will demonstrate a progressive increase in ROM as appropriate  with L wrist and thumb to be within 5-10 degrees of R to help patient resume normal ADL and IADL function.          OT Goal 4       Start:  02/05/25    Expected End:  05/05/25       Pt will participate in an assessment of her  strength as appropriate.         OT Goal 5       Start:  02/05/25    Expected End:  05/05/25       Pain to be less than or equal to 3/10 with greater than or equal to 45 minutes activity.

## 2025-02-12 ENCOUNTER — TREATMENT (OUTPATIENT)
Dept: OCCUPATIONAL THERAPY | Facility: CLINIC | Age: 50
End: 2025-02-12
Payer: COMMERCIAL

## 2025-02-12 DIAGNOSIS — R29.898 LEFT HAND WEAKNESS: ICD-10-CM

## 2025-02-12 DIAGNOSIS — M25.642 JOINT STIFFNESS OF HAND, LEFT: ICD-10-CM

## 2025-02-12 DIAGNOSIS — M79.642 HAND PAIN, LEFT: Primary | ICD-10-CM

## 2025-02-12 DIAGNOSIS — M65.4 DE QUERVAIN'S TENOSYNOVITIS, LEFT: ICD-10-CM

## 2025-02-12 PROCEDURE — 97110 THERAPEUTIC EXERCISES: CPT | Mod: GO

## 2025-02-12 PROCEDURE — 97140 MANUAL THERAPY 1/> REGIONS: CPT | Mod: GO

## 2025-02-12 ASSESSMENT — PAIN - FUNCTIONAL ASSESSMENT: PAIN_FUNCTIONAL_ASSESSMENT: 0-10

## 2025-02-12 ASSESSMENT — PAIN SCALES - GENERAL: PAINLEVEL_OUTOF10: 5 - MODERATE PAIN

## 2025-02-12 NOTE — PROGRESS NOTES
Occupational Therapy    OT Treatment    Patient Name: Jessica Brown  MRN: [id number]  Today's Date: 2/12/2025     Time Calculation  Start Time: 1456  Stop Time: 1530  Time Calculation (min): 34 min    Visit Number: 2  Therapeutic Procedures:      OT Therapeutic Procedures Time Entry  Manual Therapy Time Entry: 10  Therapeutic Exercise Time Entry: 24                         Current Problem:  1. Hand pain, left        2. De Quervain's tenosynovitis, left  Follow Up In Occupational Therapy      3. Joint stiffness of hand, left        4. Left hand weakness            Subjective     General: Pt presents to therapy today with c/o of pain at L hand and wrist. Pt states that it has not improved since her last visit. Pt reports that compression sleeve is too tight. Pt set up follow-up appointment with Dr. Pollard next week d/t severity of her reported symptoms.     Pt arrived 26 minutes late to appointment.   OT Received On: 02/12/25     Pain:  Pain Assessment  Pain Assessment: 0-10  0-10 (Numeric) Pain Score: 5 - Moderate pain  Pain Location: Hand  Pain Orientation: Left       Objective       Therapy/Activity:   DOS: 1/07/25  Post-op: 4 weeks  5 weeks    2/05/25 2/12/2025    Exercises: Reps:    AROM Wrist flex/ext, RD/UD, pro/sup   Thumb all positions   1 x 3 reps, hold 5 sec each   Wrist flex/ext, RD/UD, pro/sup   Thumb all positions   1 x 5 reps, hold 5 sec each                              HEP provided to pt on 2/5/25 HEP provided to pt including hand and wrist . Pt instructed to complete 4-8x a day, 3-5 reps within pain free range. Handouts provided to pt.     Activities:     Edema mgmt Fitted pt with tubi  E to assist with reducing edema about L wrist. Instructed pt in its care and use.     Edu pt on various edema mgmt techniques including icing and elevating   Replaced with tubi  F   Scar mgmt Instructed pt in scar mgmt techniques including STM (circles, zigzags, drag and pulls) with Vitamin E or substitute.       Applied 50/50 mix to scar to assist with flattening and softening scar.               Modalities:                         Manual:     STM  With lotion, about radial scar. Increased sensitivity at proximal aspect of scar. Very light STM d/t pt's hypersensitivity.                   Functional review:      Completed on: 2/5/25 Evaluation         OP EDUCATION:  Education  Individual(s) Educated: Patient  Education Provided: Anatomy & Physiology, Diagnosis & Precautions, Risk and benefits of OT discussed with patient or other, POC discussed and agreed upon, Edema control  Home Program: AROM, Edema control, Handout issued    Assessment:   Pt participated in therapeutic exercises and activities as needed to progress hand, wrist mobility and decrease pain. Continued AROM of thumb, wrist and hand. Gentle STM about scar to decrease sensitivity. Replaced compression sleeve with larger size. Pt scheduled a follow up appointment with Dr. Pollard d/t limited improvement in symptoms. Instructed pt to continue to complete HEP in pain-free range. Pt verbalized understanding.      Plan:   Pt to continue addressing densensitization, LUE ROM.      Goals:  Active       OT Goals       OT Goal 1       Start:  02/05/25    Expected End:  05/05/25       LTG - Patient will indicate/ demonstrate the ability to resume all preinjury ADLs and IADLs without significant limits secondary to decreased ROM, decreased strength and/or pain as indicated by Quickdash score of less than 30%.          OT Goal 2       Start:  02/05/25    Expected End:  05/05/25       Develop and issue HEP to help maximize ROM, strength and tolerance to help maximize return to all pre-onset activities.          OT Goal 3       Start:  02/05/25    Expected End:  05/05/25       Patient will demonstrate a progressive increase in ROM as appropriate with L wrist and thumb to be within 5-10 degrees of R to help patient resume normal ADL and IADL function.          OT Goal 4        Start:  02/05/25    Expected End:  05/05/25       Pt will participate in an assessment of her  strength as appropriate.         OT Goal 5       Start:  02/05/25    Expected End:  05/05/25       Pain to be less than or equal to 3/10 with greater than or equal to 45 minutes activity.

## 2025-02-13 ENCOUNTER — OFFICE VISIT (OUTPATIENT)
Dept: ORTHOPEDIC SURGERY | Facility: HOSPITAL | Age: 50
End: 2025-02-13
Payer: COMMERCIAL

## 2025-02-13 VITALS — BODY MASS INDEX: 24.84 KG/M2 | WEIGHT: 135 LBS | HEIGHT: 62 IN

## 2025-02-13 DIAGNOSIS — M65.4 DE QUERVAIN'S TENOSYNOVITIS, LEFT: Primary | ICD-10-CM

## 2025-02-13 PROCEDURE — 1036F TOBACCO NON-USER: CPT | Performed by: ORTHOPAEDIC SURGERY

## 2025-02-13 PROCEDURE — 99211 OFF/OP EST MAY X REQ PHY/QHP: CPT | Performed by: ORTHOPAEDIC SURGERY

## 2025-02-13 PROCEDURE — 3008F BODY MASS INDEX DOCD: CPT | Performed by: ORTHOPAEDIC SURGERY

## 2025-02-13 RX ORDER — METHYLPREDNISOLONE 4 MG/1
4 TABLET ORAL ONCE
Qty: 21 TABLET | Refills: 0 | Status: SHIPPED | OUTPATIENT
Start: 2025-02-13 | End: 2025-02-13

## 2025-02-13 RX ORDER — MELOXICAM 15 MG/1
15 TABLET ORAL DAILY
Qty: 30 TABLET | Refills: 5 | Status: SHIPPED | OUTPATIENT
Start: 2025-02-13 | End: 2026-02-13

## 2025-02-13 NOTE — PROGRESS NOTES
49 y.o. female presents today for for follow up after left first dorsal compartment release with mass excision. The patient reports symptoms improving but does still have pain. The DOS was 4 weeks ago. Pain is controlled. Patient denies numbness and tingling except top of thumb decreased sensation, improved over index finger.  Continues to have pain and swelling.  Has been going to therapy.  Has pain that will run up her arm at times.      Review of Systems    Constitutional: see HPI, no fever, no chills, not feeling tired, no significant weight gain or weight loss.   Eyes: No vision changes  ENT: no nosebleeds.   Cardiovascular: no chest pain.   Respiratory: no shortness of breath and no cough.   Gastrointestinal: no abdominal pain, no nausea, no vomiting and no diarrhea.   Musculoskeletal: per HPI  Neurological: no headache, no gait disturbances  Psychiatric: no depression and no sleep disturbances.   Endocrine: no muscle weakness and no muscle cramps.   Hematologic/Lymphatic: no swollen glands and no tendency for easy bruising or excessive swelling.     Patient's past medical history, past surgical history, allergies, and medications have been reviewed unless otherwise noted in the chart.     Hand/Wrist Post-Op Exam  Inspection:  no evidence of infection, no erythema, Palpation:  compartments are soft, Range of Motion:  F/E 80/80, S/P 90/90 Finger ROM Full extension, full flexion Stability:  stable Strength:  5/5 F/E, 5/5 Adduction/Abduction 5/5 Opposition Skin:  incision site clean, dry and intact, healing without complication, Vascular:  capillary refill <2 seconds distally, Sensation:  intact to light touch distally, decreased over the thumb but intact    Constitutional   General appearance: Alert and in no acute distress. Well developed, well nourished.    Eyes   External Eye, Conjunctiva and lids: Normal external exam - pupils were equal in size, round, reactive to light (PERRL) with normal accommodation and  extraocular movements intact (EOMI).   Ears, Nose, Mouth, and Throat   Hearing: Normal.   Neck   Neck: No neck mass was observed. Supple.   Pulmonary   Respiratory effort: No respiratory distress.   Cardiovascular   Examination of extremities: No peripheral edema.   Psychiatric   Judgment and insight: Intact.   Orientation to person, place, and time: Alert and oriented x 3.       Mood and affect: Normal.      Pathology shows ganglion cyst    4 weeks status post left wrist first dorsal compartment release with mass excision  Based on the history, physical exam and imaging studies above, the patient's presentation is consistent with the above diagnosis.  I had a long discussion with the patient regarding their presentation and the treatment options.    We again discussed her treatment options going forward along with their associated risks and benefits. After thorough discussion, the patient has elected to proceed with postoperative care. All questions were answered to the patients satisfaction who seems satisfied with the plan.  They will call the office with any questions/concerns.     Vitamin E cream prn to scar tissue  Activities as tolerated  Occupational Therapy evaluation and treatment  Medrol Dosepak take steroid taper  Mobic 15 mg once daily  FU 4 weeks - no XR

## 2025-02-13 NOTE — PROGRESS NOTES
6 Weeks status post  after left first dorsal compartment release with mass excision.   Patient reports some swelling today along with pain, burning in the elbow

## 2025-02-17 ENCOUNTER — TREATMENT (OUTPATIENT)
Dept: OCCUPATIONAL THERAPY | Facility: CLINIC | Age: 50
End: 2025-02-17
Payer: COMMERCIAL

## 2025-02-17 ENCOUNTER — APPOINTMENT (OUTPATIENT)
Dept: ORTHOPEDIC SURGERY | Facility: HOSPITAL | Age: 50
End: 2025-02-17
Payer: COMMERCIAL

## 2025-02-17 DIAGNOSIS — R29.898 LEFT HAND WEAKNESS: ICD-10-CM

## 2025-02-17 DIAGNOSIS — M25.642 JOINT STIFFNESS OF HAND, LEFT: ICD-10-CM

## 2025-02-17 DIAGNOSIS — M65.4 DE QUERVAIN'S TENOSYNOVITIS, LEFT: ICD-10-CM

## 2025-02-17 DIAGNOSIS — M79.642 HAND PAIN, LEFT: Primary | ICD-10-CM

## 2025-02-17 PROCEDURE — 97140 MANUAL THERAPY 1/> REGIONS: CPT | Mod: GO

## 2025-02-17 PROCEDURE — 97110 THERAPEUTIC EXERCISES: CPT | Mod: GO

## 2025-02-17 ASSESSMENT — PAIN - FUNCTIONAL ASSESSMENT: PAIN_FUNCTIONAL_ASSESSMENT: 0-10

## 2025-02-17 ASSESSMENT — PAIN SCALES - GENERAL: PAINLEVEL_OUTOF10: 5 - MODERATE PAIN

## 2025-02-17 NOTE — PROGRESS NOTES
Occupational Therapy    OT Treatment    Patient Name: Jessica Brown  MRN: [id number]  Today's Date: 2/17/2025     Time Calculation  Start Time: 1621  Stop Time: 1700  Time Calculation (min): 39 min    Visit Number: 3  Therapeutic Procedures:      OT Therapeutic Procedures Time Entry  Manual Therapy Time Entry: 15  Therapeutic Exercise Time Entry: 24                         Current Problem:  1. Hand pain, left        2. De Quervain's tenosynovitis, left  Follow Up In Occupational Therapy      3. Joint stiffness of hand, left        4. Left hand weakness            Subjective     General: Pt states that she saw ortho on Thursday, received Mobic medication and steroid for pain relief. Pt reports that today her pain is not as bad however yesterday she was in a lot of pain.     Pt arrived 6 minutes late to appointment.   OT Received On: 02/17/25     Pain:  Pain Assessment  Pain Assessment: 0-10  0-10 (Numeric) Pain Score: 5 - Moderate pain  Pain Location: Hand  Pain Orientation: Left    Objective       Therapy/Activity:   DOS: 1/07/25  Post-op: 4 weeks  5 weeks     2/05/25 2/12/2025 2/17/2025    Exercises: Reps:     AROM Wrist flex/ext, RD/UD, pro/sup   Thumb all positions   1 x 3 reps, hold 5 sec each   Wrist flex/ext, RD/UD, pro/sup   Thumb all positions   1 x 5 reps, hold 5 sec each   Wrist flex/ext, RD/UD, pro/sup   Thumb all positions   1 x 5 reps, hold 5 sec each                                   HEP provided to pt on 2/5/25 HEP provided to pt including hand and wrist . Pt instructed to complete 4-8x a day, 3-5 reps within pain free range. Handouts provided to pt.      Activities:      Edema mgmt Fitted pt with tubi  E to assist with reducing edema about L wrist. Instructed pt in its care and use.     Edu pt on various edema mgmt techniques including icing and elevating   Replaced with tubi  F    Scar mgmt Instructed pt in scar mgmt techniques including STM (circles, zigzags, drag and pulls) with Vitamin  E or substitute.      Applied 50/50 mix to scar to assist with flattening and softening scar.                  Modalities:                              Manual:      STM  With lotion, about radial scar. Increased sensitivity at proximal aspect of scar. Very light STM d/t pt's hypersensitivity.  With lotion, about radial scar. Increased sensitivity at proximal aspect of scar. Pt able to tolerate minimal pressure along scar.      Retrograde massage   Along dorsal aspect of L wrist to decrease edema.                Functional review:       Completed on: 2/5/25 Evaluation          OP EDUCATION:  Education  Individual(s) Educated: Patient  Education Provided: Anatomy & Physiology, Diagnosis & Precautions, Risk and benefits of OT discussed with patient or other, POC discussed and agreed upon, Edema control  Home Program: AROM, Edema control, Handout issued    Assessment:    Pt participated in therapeutic exercises and activities as needed to progress hand, wrist mobility and decrease pain. Continued AROM of thumb, wrist and hand. Gentle STM about scar to decrease sensitivity. Retrograde massage along dorsal aspect of L wrist to decrease edema. Pt able to tolerate minimal-moderate STM along scar today's date.      Plan:    Pt to continue addressing densensitization, LUE ROM.      Goals:  Active       OT Goals       OT Goal 1       Start:  02/05/25    Expected End:  05/05/25       LTG - Patient will indicate/ demonstrate the ability to resume all preinjury ADLs and IADLs without significant limits secondary to decreased ROM, decreased strength and/or pain as indicated by Quickdash score of less than 30%.          OT Goal 2       Start:  02/05/25    Expected End:  05/05/25       Develop and issue HEP to help maximize ROM, strength and tolerance to help maximize return to all pre-onset activities.          OT Goal 3       Start:  02/05/25    Expected End:  05/05/25       Patient will demonstrate a progressive increase in ROM  as appropriate with L wrist and thumb to be within 5-10 degrees of R to help patient resume normal ADL and IADL function.          OT Goal 4       Start:  02/05/25    Expected End:  05/05/25       Pt will participate in an assessment of her  strength as appropriate.         OT Goal 5       Start:  02/05/25    Expected End:  05/05/25       Pain to be less than or equal to 3/10 with greater than or equal to 45 minutes activity.

## 2025-02-19 ENCOUNTER — APPOINTMENT (OUTPATIENT)
Dept: PRIMARY CARE | Facility: CLINIC | Age: 50
End: 2025-02-19
Payer: COMMERCIAL

## 2025-02-19 DIAGNOSIS — L90.8 SKIN AGING: Primary | ICD-10-CM

## 2025-02-19 DIAGNOSIS — L65.9 THINNING HAIR: ICD-10-CM

## 2025-02-19 PROCEDURE — 99024 POSTOP FOLLOW-UP VISIT: CPT | Performed by: INTERNAL MEDICINE

## 2025-02-19 PROCEDURE — PRPHR PRP HAIR REJUVENATION: Performed by: INTERNAL MEDICINE

## 2025-02-19 NOTE — PROGRESS NOTES
Patient presented for PRP treatment of aging skin, thinning hair.      This is a 49 years old English-speaking lady with medical history of anxiety disorder, depression, s/p COVID -19 illness 12/2021., s/p left dorsal compartment release with mass excision on wrist 1/7/2025.  Patient is presented for PRP treatment.  Concerned about aging skin, interested to have PRP treatment done.  Discussed with patient about PRP treatment, complications, including but not limited to hematoma, bleeding, infection.  Patient is aware and interested to proceed.  Pictures has been done before procedure and after.  Consent form was signed.    5 ml of PRP was placed to scalp area.

## 2025-02-24 ENCOUNTER — APPOINTMENT (OUTPATIENT)
Dept: OCCUPATIONAL THERAPY | Facility: CLINIC | Age: 50
End: 2025-02-24
Payer: COMMERCIAL

## 2025-02-26 ENCOUNTER — APPOINTMENT (OUTPATIENT)
Dept: ORTHOPEDIC SURGERY | Facility: CLINIC | Age: 50
End: 2025-02-26
Payer: COMMERCIAL

## 2025-03-03 ENCOUNTER — DOCUMENTATION (OUTPATIENT)
Dept: OCCUPATIONAL THERAPY | Facility: CLINIC | Age: 50
End: 2025-03-03
Payer: COMMERCIAL

## 2025-03-03 NOTE — PROGRESS NOTES
Occupational Therapy                 Therapy Communication Note    Patient Name: Jessica Brown  MRN: [id number]  Today's Date: 3/3/2025     Discipline: Occupational Therapy    Missed Visit Reason:  No show to appointment. Called pt, sent to voicemail. Left a message to reschedule.     Missed Time: No Show

## 2025-03-05 ENCOUNTER — TREATMENT (OUTPATIENT)
Dept: OCCUPATIONAL THERAPY | Facility: CLINIC | Age: 50
End: 2025-03-05
Payer: COMMERCIAL

## 2025-03-05 DIAGNOSIS — M65.4 DE QUERVAIN'S TENOSYNOVITIS, LEFT: ICD-10-CM

## 2025-03-05 DIAGNOSIS — M25.642 JOINT STIFFNESS OF HAND, LEFT: ICD-10-CM

## 2025-03-05 DIAGNOSIS — R29.898 LEFT HAND WEAKNESS: ICD-10-CM

## 2025-03-05 DIAGNOSIS — M79.642 HAND PAIN, LEFT: Primary | ICD-10-CM

## 2025-03-05 PROCEDURE — 97530 THERAPEUTIC ACTIVITIES: CPT | Mod: GO

## 2025-03-05 PROCEDURE — 97110 THERAPEUTIC EXERCISES: CPT | Mod: GO

## 2025-03-05 ASSESSMENT — PAIN - FUNCTIONAL ASSESSMENT: PAIN_FUNCTIONAL_ASSESSMENT: 0-10

## 2025-03-05 ASSESSMENT — PAIN SCALES - GENERAL: PAINLEVEL_OUTOF10: 4

## 2025-03-05 NOTE — PROGRESS NOTES
Occupational Therapy    OT Treatment    Patient Name: Jessica Brown  MRN: [id number]  Today's Date: 3/5/2025     Time Calculation  Start Time: 1540  Stop Time: 1615  Time Calculation (min): 35 min    Visit Number: 4  Therapeutic Procedures:      OT Therapeutic Procedures Time Entry  Manual Therapy Time Entry: 10  Therapeutic Activity Time Entry: 10  Therapeutic Exercise Time Entry: 15                         Current Problem:  1. Hand pain, left        2. De Quervain's tenosynovitis, left  Follow Up In Occupational Therapy      3. Joint stiffness of hand, left        4. Left hand weakness            Subjective     General: Pt states that she has been trying to take it easier on her wrist and that it feels slightly better. She goes to a specialist that uses fork tuning for her wrist pain. Reports that this helps her.     Pt 10 minutes late to appointment.   OT Received On: 03/05/25     Pain:  Pain Assessment  Pain Assessment: 0-10  0-10 (Numeric) Pain Score: 4    Objective       Therapy/Activity:   DOS: 1/07/25  5 weeks      2/12/2025  2/17/2025  3/5/2025    Exercises:      AROM Wrist flex/ext, RD/UD, pro/sup   Thumb all positions   1 x 5 reps, hold 5 sec each   Wrist flex/ext, RD/UD, pro/sup   Thumb all positions   1 x 5 reps, hold 5 sec each   Wrist flex/ext, RD/UD, pro/sup   Thumb all positions   1 x 5 reps, hold 5 sec each                                   HEP provided to pt on 2/5/25      Activities:      Edema mgmt Replaced with tubi  F     Scar mgmt      Velcro checkers   Alt opp to retrieve checkers during haiku game (30)         Modalities:                              Manual:      STM With lotion, about radial scar. Increased sensitivity at proximal aspect of scar. Very light STM d/t pt's hypersensitivity.  With lotion, about radial scar. Increased sensitivity at proximal aspect of scar. Pt able to tolerate minimal pressure along scar.    Ice massage to dorsum of L wrist for pain relief and to decrease  inflammation and retrograded massage for edema.    Retrograde massage  Along dorsal aspect of L wrist to decrease edema.                 Functional review:       Completed on: 2/5/25          OP EDUCATION:  Education  Individual(s) Educated: Patient  Education Provided: Anatomy & Physiology, Diagnosis & Precautions, Risk and benefits of OT discussed with patient or other, POC discussed and agreed upon, Edema control  Home Program: AROM, Edema control, Handout issued    Assessment:   Pt participated in therapeutic exercises and activities as needed to progress hand, wrist mobility and decrease pain. Continued AROM of thumb, wrist and hand. Pt able to tolerate ice massage for pain relief. However, pt reporting increased pain after session, no number given. Pt states that heat helps reduce her pain significantly. Instructed pt to use modalities for pain at home (heat, massage, ice).      Plan:   Pt to continue addressing densensitization, LUE ROM, pain relief with modalities.      Goals:  Active       OT Goals       OT Goal 1       Start:  02/05/25    Expected End:  05/05/25       LTG - Patient will indicate/ demonstrate the ability to resume all preinjury ADLs and IADLs without significant limits secondary to decreased ROM, decreased strength and/or pain as indicated by Quickdash score of less than 30%.          OT Goal 2       Start:  02/05/25    Expected End:  05/05/25       Develop and issue HEP to help maximize ROM, strength and tolerance to help maximize return to all pre-onset activities.          OT Goal 3       Start:  02/05/25    Expected End:  05/05/25       Patient will demonstrate a progressive increase in ROM as appropriate with L wrist and thumb to be within 5-10 degrees of R to help patient resume normal ADL and IADL function.          OT Goal 4       Start:  02/05/25    Expected End:  05/05/25       Pt will participate in an assessment of her  strength as appropriate.         OT Goal 5       Start:   02/05/25    Expected End:  05/05/25       Pain to be less than or equal to 3/10 with greater than or equal to 45 minutes activity.

## 2025-03-12 ENCOUNTER — TREATMENT (OUTPATIENT)
Dept: OCCUPATIONAL THERAPY | Facility: CLINIC | Age: 50
End: 2025-03-12
Payer: COMMERCIAL

## 2025-03-12 ENCOUNTER — APPOINTMENT (OUTPATIENT)
Dept: ORTHOPEDIC SURGERY | Facility: CLINIC | Age: 50
End: 2025-03-12
Payer: COMMERCIAL

## 2025-03-12 VITALS — HEIGHT: 62 IN | BODY MASS INDEX: 25.76 KG/M2 | WEIGHT: 140 LBS

## 2025-03-12 DIAGNOSIS — M25.642 JOINT STIFFNESS OF HAND, LEFT: ICD-10-CM

## 2025-03-12 DIAGNOSIS — M65.4 DE QUERVAIN'S TENOSYNOVITIS, LEFT: Primary | ICD-10-CM

## 2025-03-12 DIAGNOSIS — R29.898 LEFT HAND WEAKNESS: Primary | ICD-10-CM

## 2025-03-12 DIAGNOSIS — M79.642 HAND PAIN, LEFT: ICD-10-CM

## 2025-03-12 DIAGNOSIS — M65.4 DE QUERVAIN'S TENOSYNOVITIS, LEFT: ICD-10-CM

## 2025-03-12 PROCEDURE — 99024 POSTOP FOLLOW-UP VISIT: CPT | Performed by: ORTHOPAEDIC SURGERY

## 2025-03-12 PROCEDURE — 97530 THERAPEUTIC ACTIVITIES: CPT | Mod: GO

## 2025-03-12 PROCEDURE — 97110 THERAPEUTIC EXERCISES: CPT | Mod: GO

## 2025-03-12 PROCEDURE — 3008F BODY MASS INDEX DOCD: CPT | Performed by: ORTHOPAEDIC SURGERY

## 2025-03-12 PROCEDURE — 1036F TOBACCO NON-USER: CPT | Performed by: ORTHOPAEDIC SURGERY

## 2025-03-12 RX ORDER — MELOXICAM 15 MG/1
15 TABLET ORAL DAILY
Qty: 30 TABLET | Refills: 3 | Status: SHIPPED | OUTPATIENT
Start: 2025-03-12 | End: 2026-03-12

## 2025-03-12 ASSESSMENT — PAIN - FUNCTIONAL ASSESSMENT
PAIN_FUNCTIONAL_ASSESSMENT: 0-10
PAIN_FUNCTIONAL_ASSESSMENT: 0-10

## 2025-03-12 ASSESSMENT — ENCOUNTER SYMPTOMS
DEPRESSION: 0
LOSS OF SENSATION IN FEET: 0
OCCASIONAL FEELINGS OF UNSTEADINESS: 0

## 2025-03-12 ASSESSMENT — PAIN SCALES - GENERAL
PAINLEVEL_OUTOF10: 5 - MODERATE PAIN
PAINLEVEL_OUTOF10: 5 - MODERATE PAIN

## 2025-03-12 NOTE — PROGRESS NOTES
Occupational Therapy    OT Treatment/Discharge    Patient Name: Jessica Brown  MRN: [id number]  Today's Date: 3/12/2025     Time Calculation  Start Time: 1335  Stop Time: 1400  Time Calculation (min): 25 min    Visit Number: 5  Therapeutic Procedures:      OT Therapeutic Procedures Time Entry  Therapeutic Activity Time Entry: 15  Therapeutic Exercise Time Entry: 10                         Current Problem:  1. Left hand weakness        2. De Quervain's tenosynovitis, left  Follow Up In Occupational Therapy      3. Joint stiffness of hand, left        4. Hand pain, left            Subjective     General: Pt states that she is still having pain at her hand and wrist. She has less pain while using her hand, however experiences more pain afterwards. Pt states that she would like to be discharged from therapy as she has to prioritize taking care of her mother.        Pt had to leave session early d/t prior obligation.     OT Received On: 03/12/25  Reason for Referral: hand pain L  Referred By: Dr. Pollard  Pain:  Pain Assessment  Pain Assessment: 0-10  0-10 (Numeric) Pain Score: 5 - Moderate pain  Pain Location: Hand  Pain Orientation: Left    Objective       Therapy/Activity:   DOS: 1/07/25        2/17/2025  3/5/2025  3/12/2025    Exercises:      AROM Wrist flex/ext, RD/UD, pro/sup   Thumb all positions   1 x 5 reps, hold 5 sec each   Wrist flex/ext, RD/UD, pro/sup   Thumb all positions   1 x 5 reps, hold 5 sec each   Wrist flex/ext, RD/UD, pro/sup   Thumb all positions   1 x 5 reps, hold 5 sec each                                  HEP provided to pt on 2/5/25      Activities:      Edema mgmt      Scar mgmt      Velcro checkers  Alt opp to retrieve checkers during haiku game (30)          Modalities:                              Manual:      STM With lotion, about radial scar. Increased sensitivity at proximal aspect of scar. Pt able to tolerate minimal pressure along scar.    Ice massage to dorsum of L wrist for pain  relief and to decrease inflammation and retrograded massage for edema.     Retrograde massage Along dorsal aspect of L wrist to decrease edema.                  Functional review:       Completed on: 2/5/25, 3/12/25   Discharge     Measurements:  Thumb Measurements: 3/12/2025   MP IP DPC R Abd P Abd   Right        Left -5/15 +5/30 2.5 50 50      Wrist Measurements: 3/12/2025   WFL unless documented below   Flexion  Extension Radial Dev Ulnar Dev Supination Pronation   Right 75 65 30 25 85 90   Left 40 60 20 15 85 90       Thumb Measurements: 2/5/25   MP IP DPC R Abd P Abd   Right +30/ +20/ 0 75 85   Left +10/50 +20/52 4 45 50      Wrist Measurements: 2/5/25  WFL unless documented below   Flexion  Extension Radial Dev Ulnar Dev Supination Pronation   Right 75 65 30 25 85 90   Left 30 40 15 15 70 80     OP EDUCATION:  Education  Individual(s) Educated: Patient  Education Provided: Anatomy & Physiology, Diagnosis & Precautions, Risk and benefits of OT discussed with patient or other, POC discussed and agreed upon, Edema control  Home Program: AROM, Edema control, Handout issued    Assessment:   Pt participated in therapeutic exercises and activities as needed to progress L wrist and thumb mobility and decrease pain. Measurements taken today's date. Pt demo'd improved wrist flexion, extension, radial deviation, supination and pronation. L thumb mobility remained the same/decreased since last measurements (2/05/25). Pt is still experiencing pain after activity and has sensitivity at scar. However, pt requesting to be discharged from therapy services as she has to prioritize taking care of her mother. She will continue her HEP IND at home.  Pt to contact OT if they have any questions or concerns.        Plan:   Pt to be discharged from OT services. Will continue HEP IND at home.      Goals:  Resolved       OT Goals       OT Goal 1 (Not met)       Start:  02/05/25    Expected End:  05/05/25    Resolved:  03/12/25    LTG -  Patient will indicate/ demonstrate the ability to resume all preinjury ADLs and IADLs without significant limits secondary to decreased ROM, decreased strength and/or pain as indicated by Quickdash score of less than 30%.          OT Goal 2 (Met)       Start:  02/05/25    Expected End:  05/05/25    Resolved:  03/12/25    Develop and issue HEP to help maximize ROM, strength and tolerance to help maximize return to all pre-onset activities.          OT Goal 3 (Not met)       Start:  02/05/25    Expected End:  05/05/25    Resolved:  03/12/25    Patient will demonstrate a progressive increase in ROM as appropriate with L wrist and thumb to be within 5-10 degrees of R to help patient resume normal ADL and IADL function.          OT Goal 4 (Not met)       Start:  02/05/25    Expected End:  05/05/25    Resolved:  03/12/25    Pt will participate in an assessment of her  strength as appropriate.         OT Goal 5 (Not met)       Start:  02/05/25    Expected End:  05/05/25    Resolved:  03/12/25    Pain to be less than or equal to 3/10 with greater than or equal to 45 minutes activity.

## 2025-03-12 NOTE — PROGRESS NOTES
49 y.o. female presents today for for follow up after left first dorsal compartment release with mass excision. The patient reports symptoms improving but does still have pain. The DOS was 8 weeks ago. Pain is controlled. Patient denies numbness and tingling except top of thumb decreased sensation, improved over index finger.  Continues to have pain and swelling.  Has been going to therapy.  States overall she is about 60% better.  Symptoms continue to improve.  Has been to occupational therapy.      Review of Systems    Constitutional: see HPI, no fever, no chills, not feeling tired, no significant weight gain or weight loss.   Eyes: No vision changes  ENT: no nosebleeds.   Cardiovascular: no chest pain.   Respiratory: no shortness of breath and no cough.   Gastrointestinal: no abdominal pain, no nausea, no vomiting and no diarrhea.   Musculoskeletal: per HPI  Neurological: no headache, no gait disturbances  Psychiatric: no depression and no sleep disturbances.   Endocrine: no muscle weakness and no muscle cramps.   Hematologic/Lymphatic: no swollen glands and no tendency for easy bruising or excessive swelling.     Patient's past medical history, past surgical history, allergies, and medications have been reviewed unless otherwise noted in the chart.     Hand/Wrist Post-Op Exam  Inspection:  no evidence of infection, no erythema, Palpation:  compartments are soft, Range of Motion:  F/E 80/80, S/P 90/90 Finger ROM Full extension, full flexion Stability:  stable Strength:  5/5 F/E, 5/5 Adduction/Abduction 5/5 Opposition Skin:  incision site clean, dry and intact, healing without complication, Vascular:  capillary refill <2 seconds distally, Sensation:  intact to light touch distally, decreased over the thumb but intact, positive Tinel's around the CMC joint    Constitutional   General appearance: Alert and in no acute distress. Well developed, well nourished.    Eyes   External Eye, Conjunctiva and lids: Normal  external exam - pupils were equal in size, round, reactive to light (PERRL) with normal accommodation and extraocular movements intact (EOMI).   Ears, Nose, Mouth, and Throat   Hearing: Normal.   Neck   Neck: No neck mass was observed. Supple.   Pulmonary   Respiratory effort: No respiratory distress.   Cardiovascular   Examination of extremities: No peripheral edema.   Psychiatric   Judgment and insight: Intact.   Orientation to person, place, and time: Alert and oriented x 3.       Mood and affect: Normal.      Pathology shows ganglion cyst    8 weeks status post left wrist first dorsal compartment release with mass excision  Based on the history, physical exam and imaging studies above, the patient's presentation is consistent with the above diagnosis.  I had a long discussion with the patient regarding their presentation and the treatment options.    We again discussed her treatment options going forward along with their associated risks and benefits. After thorough discussion, the patient has elected to proceed with postoperative care. All questions were answered to the patients satisfaction who seems satisfied with the plan.  They will call the office with any questions/concerns.     Vitamin E cream prn to scar tissue  Activities as tolerated  Occupational Therapy evaluation and treatment prn  Mobic 15 mg once daily  Voltaren gel prn  FU 4 weeks prn - no XR

## 2025-03-12 NOTE — PROGRESS NOTES
9 weeks status post left wrist first dorsal compartment release with mass excision done 1/7/2025. Pain is well controlled with the current treatment plan. Completed Medrol Dospak. Mobic PRN. Reports numbness/tingling in the thumb. They have completed occupational therapy. They overall are doing well. They have concerns with swelling, numbness in the thumb.and a burning sensation in the elbow.

## 2025-05-28 ENCOUNTER — OFFICE VISIT (OUTPATIENT)
Dept: PRIMARY CARE | Facility: CLINIC | Age: 50
End: 2025-05-28
Payer: COMMERCIAL

## 2025-05-28 VITALS
RESPIRATION RATE: 16 BRPM | WEIGHT: 142 LBS | DIASTOLIC BLOOD PRESSURE: 68 MMHG | SYSTOLIC BLOOD PRESSURE: 118 MMHG | TEMPERATURE: 98.1 F | HEART RATE: 64 BPM | BODY MASS INDEX: 25.97 KG/M2

## 2025-05-28 DIAGNOSIS — R11.15 CYCLIC VOMITING SYNDROME: ICD-10-CM

## 2025-05-28 DIAGNOSIS — R11.0 NAUSEA: Primary | ICD-10-CM

## 2025-05-28 DIAGNOSIS — F41.9 ANXIETY: ICD-10-CM

## 2025-05-28 PROCEDURE — 1036F TOBACCO NON-USER: CPT | Performed by: INTERNAL MEDICINE

## 2025-05-28 PROCEDURE — 99214 OFFICE O/P EST MOD 30 MIN: CPT | Performed by: INTERNAL MEDICINE

## 2025-05-28 RX ORDER — FLUOXETINE 10 MG/1
10 CAPSULE ORAL DAILY
Qty: 30 CAPSULE | Refills: 2 | Status: SHIPPED | OUTPATIENT
Start: 2025-05-28 | End: 2025-05-28 | Stop reason: SDUPTHER

## 2025-05-28 RX ORDER — ONDANSETRON 4 MG/1
4 TABLET, ORALLY DISINTEGRATING ORAL EVERY 8 HOURS PRN
Qty: 30 TABLET | Refills: 1 | Status: SHIPPED | OUTPATIENT
Start: 2025-05-28 | End: 2025-06-27

## 2025-05-28 RX ORDER — FLUOXETINE 10 MG/1
10 CAPSULE ORAL DAILY
Qty: 30 CAPSULE | Refills: 2 | Status: SHIPPED | OUTPATIENT
Start: 2025-05-28 | End: 2025-06-27

## 2025-05-28 RX ORDER — AMITRIPTYLINE HYDROCHLORIDE 25 MG/1
25 TABLET, FILM COATED ORAL NIGHTLY
Qty: 90 TABLET | Refills: 2 | Status: SHIPPED | OUTPATIENT
Start: 2025-05-28 | End: 2025-08-26

## 2025-05-28 ASSESSMENT — ENCOUNTER SYMPTOMS
FATIGUE: 1
ACTIVITY CHANGE: 1
NERVOUS/ANXIOUS: 1
SLEEP DISTURBANCE: 1

## 2025-05-28 ASSESSMENT — PAIN SCALES - GENERAL: PAINLEVEL_OUTOF10: 0-NO PAIN

## 2025-05-28 NOTE — PROGRESS NOTES
Subjective   Patient ID: Jessica Brwon is a 49 y.o. female who presents with chief complaint of headaches, anxiety, worse in the morning.  Having nausea.  Feeling better as the day progressed.  Has been having poor concentration, depressed, having anxiety.     HPI     This is a 49 years old [place]-speaking lady with medical history of anxiety disorder, depression, s/p COVID -19 illness 12/2021.  Patient is  presented for evaluation and treatment of the above complaints.  Patient has ongoing issues with nausea, episodes.  Patient is taking amitriptyline at nighttime, having nausea in the morning.  Patient is weak.  Having headaches in the morning.  Depressed, having anxiety episodes.  Has difficulty to concentrate.     Review of Systems   Constitutional:  Positive for activity change and fatigue.   Psychiatric/Behavioral:  Positive for sleep disturbance. The patient is nervous/anxious.        Objective   /68   Pulse 64   Temp 36.7 °C (98.1 °F) (Temporal)   Resp 16   Wt 64.4 kg (142 lb)   BMI 25.97 kg/m²     Physical Exam  Constitutional:       Appearance: Normal appearance.   HENT:      Head: Normocephalic and atraumatic.      Right Ear: External ear normal.      Left Ear: External ear normal.      Nose: Nose normal.      Mouth/Throat:      Mouth: Mucous membranes are moist.   Eyes:      Extraocular Movements: Extraocular movements intact.      Conjunctiva/sclera: Conjunctivae normal.      Pupils: Pupils are equal, round, and reactive to light.   Cardiovascular:      Rate and Rhythm: Normal rate.      Pulses: Normal pulses.      Heart sounds: Normal heart sounds.   Pulmonary:      Effort: Pulmonary effort is normal.      Breath sounds: Normal breath sounds.   Abdominal:      General: Abdomen is flat. Bowel sounds are normal.      Palpations: Abdomen is soft.   Musculoskeletal:         General: Normal range of motion.      Cervical back: Normal range of motion and neck supple.   Skin:     General: Skin  is warm and dry.   Neurological:      General: No focal deficit present.      Mental Status: She is alert.   Psychiatric:         Mood and Affect: Mood normal.         Behavior: Behavior normal.         Thought Content: Thought content normal.         Judgment: Judgment normal.         Assessment/Plan   Problem List Items Addressed This Visit    None  Visit Diagnoses         Codes      Nausea    -  Primary R11.0    Relevant Medications    ondansetron ODT (Zofran-ODT) 4 mg disintegrating tablet      Anxiety     F41.9    Relevant Medications    FLUoxetine (PROzac) 10 mg capsule      Cyclic vomiting syndrome     R11.15    Relevant Medications    amitriptyline (Elavil) 25 mg tablet          Anxiety disorder.  Resume taking Prozac 10 mg once a day.    Return for evaluation for ADHD.  Will consider medications.    Cyclic vomiting syndrome.  Take amitriptyline 25 mg at nighttime.  Nausea episodes, start on Zofran.    Health maintenance.  Annual physical exam is up-to-date.  Last mammography  2/5/2024.   Vaccinations.   GYN  is up to date.      - Headaches,   Improved, CT scan was negative.     - H of COVID -19 illness 12/2021.   Recovered.      -H of Candida laryngitis, esophagitis.  Resolved now.     -Mild vitamin D deficiency.  Take vitamin D daily.     -H of Elevated liver enzymes.  Liver ultrasound.  Repeat Blood work.     - H of Abnormal mammography August 2020.  S/p stereotactic core biopsy lower outer with Top-Hat clip placement 8/25/2020.  Stromal fibrosis and apocrine metaplastic cysts.  Microcalcifications identified.  Repeat mammography in 1 year, referral is placed.     -Overweight with BMI  25.97  Diet, exercise.  Keep ideal BMI less than 25.  Return in 2 weeks.

## 2025-06-02 ENCOUNTER — APPOINTMENT (OUTPATIENT)
Dept: PRIMARY CARE | Facility: CLINIC | Age: 50
End: 2025-06-02
Payer: COMMERCIAL

## 2025-06-09 ENCOUNTER — TELEPHONE (OUTPATIENT)
Dept: PRIMARY CARE | Facility: CLINIC | Age: 50
End: 2025-06-09
Payer: COMMERCIAL

## 2025-06-09 NOTE — TELEPHONE ENCOUNTER
Patient has some positive progress.  Less tremor.  Added magnesium.  Taking Fluoxetine, Amitriptyline.  Having increased sleepiness.  Take Fluoxetine 6 pm.

## 2025-06-16 ENCOUNTER — OFFICE VISIT (OUTPATIENT)
Dept: PRIMARY CARE | Facility: CLINIC | Age: 50
End: 2025-06-16
Payer: COMMERCIAL

## 2025-06-16 DIAGNOSIS — M79.10 MYALGIA: ICD-10-CM

## 2025-06-16 DIAGNOSIS — E55.9 VITAMIN D DEFICIENCY: ICD-10-CM

## 2025-06-16 DIAGNOSIS — D50.9 IRON DEFICIENCY ANEMIA, UNSPECIFIED IRON DEFICIENCY ANEMIA TYPE: ICD-10-CM

## 2025-06-16 DIAGNOSIS — E53.8 FOLATE DEFICIENCY: ICD-10-CM

## 2025-06-16 DIAGNOSIS — G45.9 TIA (TRANSIENT ISCHEMIC ATTACK): Primary | ICD-10-CM

## 2025-06-16 DIAGNOSIS — E53.8 VITAMIN B12 DEFICIENCY: ICD-10-CM

## 2025-06-16 DIAGNOSIS — R73.9 HYPERGLYCEMIA, UNSPECIFIED: ICD-10-CM

## 2025-06-16 DIAGNOSIS — F41.1 GAD (GENERALIZED ANXIETY DISORDER): ICD-10-CM

## 2025-06-16 DIAGNOSIS — R53.81 MALAISE AND FATIGUE: ICD-10-CM

## 2025-06-16 DIAGNOSIS — E78.5 HYPERLIPIDEMIA, UNSPECIFIED HYPERLIPIDEMIA TYPE: ICD-10-CM

## 2025-06-16 DIAGNOSIS — Z12.31 ENCOUNTER FOR SCREENING MAMMOGRAM FOR MALIGNANT NEOPLASM OF BREAST: ICD-10-CM

## 2025-06-16 DIAGNOSIS — F41.9 ANXIETY: ICD-10-CM

## 2025-06-16 DIAGNOSIS — R53.83 MALAISE AND FATIGUE: ICD-10-CM

## 2025-06-16 LAB
25(OH)D3 SERPL-MCNC: 28 NG/ML (ref 30–100)
ALBUMIN SERPL BCP-MCNC: 4 G/DL (ref 3.4–5)
ALP SERPL-CCNC: 51 U/L (ref 45–117)
ALT SERPL W P-5'-P-CCNC: 32 U/L (ref 16–63)
ANION GAP SERPL CALC-SCNC: 18 MMOL/L (ref 10–20)
AST SERPL W P-5'-P-CCNC: 17 U/L (ref 15–37)
BASOPHILS # BLD AUTO: 0.04 X10*3/UL (ref 0.1–1.6)
BASOPHILS NFR BLD AUTO: 0.44 % (ref 0–0.3)
BILIRUB SERPL-MCNC: 0.5 MG/DL (ref 0.2–1)
BUN SERPL-MCNC: 7 MG/DL (ref 7–18)
CALCIUM SERPL-MCNC: 9.7 MG/DL (ref 8.5–10.1)
CHLORIDE SERPL-SCNC: 104 MMOL/L (ref 98–107)
CHOLEST SERPL-MCNC: 220 MG/DL (ref 0–199)
CHOLESTEROL/HDL RATIO: 3.1 (ref 4.2–7)
CO2 SERPL-SCNC: 23 MMOL/L (ref 21–32)
CREAT SERPL-MCNC: 0.65 MG/DL (ref 0.6–1.1)
EGFRCR SERPLBLD CKD-EPI 2021: >90 ML/MIN/1.73M*2
EOSINOPHIL # BLD AUTO: 0.03 X10*3/UL (ref 0.04–0.5)
EOSINOPHIL NFR BLD AUTO: 0.35 % (ref 0.7–7)
ERYTHROCYTE [DISTWIDTH] IN BLOOD BY AUTOMATED COUNT: 14.5 % (ref 11.5–14.5)
FOLATE SERPL-MCNC: 17.3 NG/ML (ref 8.6–58.9)
GLUCOSE SERPL-MCNC: 85 MG/DL (ref 74–100)
HBA1C MFR BLD: 5.4 %
HCT VFR BLD AUTO: 40.1 % (ref 36.6–46.6)
HDLC SERPL-MCNC: 70 MG/DL (ref 40–59)
HGB BLD-MCNC: 14.27 G/DL (ref 12–15.4)
IS PATIENT FASTING: YES
LDLC SERPL DIRECT ASSAY-MCNC: 125 MG/DL (ref 0–100)
LYMPHOCYTES # BLD AUTO: 1.35 X10*3/UL (ref 0–6)
LYMPHOCYTES NFR BLD AUTO: 15.88 % (ref 20.5–51.1)
MCH RBC QN AUTO: 31.2 PG (ref 26–32)
MCHC RBC AUTO-ENTMCNC: 35.6 G/DL (ref 31–38)
MCV RBC AUTO: 87.8 FL (ref 80–96)
MONOCYTES # BLD AUTO: 0.47 X10*3/UL (ref 1.6–24.9)
MONOCYTES NFR BLD AUTO: 5.54 % (ref 1.7–9.3)
NEUTROPHILS # BLD AUTO: 6.61 X10*3/UL (ref 1.4–6.5)
NEUTROPHILS NFR BLD AUTO: 77.79 % (ref 42.2–75.2)
PLATELET # BLD AUTO: 239.9 X10*3/UL (ref 150–450)
PMV BLD AUTO: 8.43 FL (ref 7.8–11)
POTASSIUM SERPL-SCNC: 3.8 MMOL/L (ref 3.5–5.1)
PROT SERPL-MCNC: 6.5 G/DL (ref 6.4–8.2)
RBC # BLD AUTO: 4.57 X10*6/UL (ref 3.9–5.3)
SODIUM SERPL-SCNC: 141 MMOL/L (ref 136–145)
TRIGL SERPL-MCNC: 115 MG/DL
TSH SERPL-ACNC: 1.17 MIU/L (ref 0.44–3.98)
VIT B12 SERPL-MCNC: 636 PG/ML (ref 193–986)
WBC # BLD AUTO: 8.5 X10*3/UL (ref 4.5–10.5)

## 2025-06-16 PROCEDURE — 82746 ASSAY OF FOLIC ACID SERUM: CPT | Performed by: INTERNAL MEDICINE

## 2025-06-16 PROCEDURE — 84443 ASSAY THYROID STIM HORMONE: CPT | Performed by: INTERNAL MEDICINE

## 2025-06-16 PROCEDURE — 83036 HEMOGLOBIN GLYCOSYLATED A1C: CPT | Performed by: INTERNAL MEDICINE

## 2025-06-16 PROCEDURE — 82306 VITAMIN D 25 HYDROXY: CPT | Performed by: INTERNAL MEDICINE

## 2025-06-16 PROCEDURE — 1036F TOBACCO NON-USER: CPT | Performed by: INTERNAL MEDICINE

## 2025-06-16 PROCEDURE — 82607 VITAMIN B-12: CPT | Performed by: INTERNAL MEDICINE

## 2025-06-16 PROCEDURE — 80053 COMPREHEN METABOLIC PANEL: CPT | Performed by: INTERNAL MEDICINE

## 2025-06-16 PROCEDURE — 99215 OFFICE O/P EST HI 40 MIN: CPT | Performed by: INTERNAL MEDICINE

## 2025-06-16 RX ORDER — ESCITALOPRAM OXALATE 5 MG/1
5 TABLET ORAL DAILY
Qty: 30 TABLET | Refills: 2 | Status: SHIPPED | OUTPATIENT
Start: 2025-06-16 | End: 2025-09-14

## 2025-06-16 RX ORDER — ALPRAZOLAM 0.25 MG/1
0.25 TABLET ORAL 3 TIMES DAILY PRN
Qty: 30 TABLET | Refills: 0 | Status: SHIPPED | OUTPATIENT
Start: 2025-06-16 | End: 2026-02-11

## 2025-06-16 ASSESSMENT — ANXIETY QUESTIONNAIRES
IF YOU CHECKED OFF ANY PROBLEMS ON THIS QUESTIONNAIRE, HOW DIFFICULT HAVE THESE PROBLEMS MADE IT FOR YOU TO DO YOUR WORK, TAKE CARE OF THINGS AT HOME, OR GET ALONG WITH OTHER PEOPLE: EXTREMELY DIFFICULT
4. TROUBLE RELAXING: NEARLY EVERY DAY
5. BEING SO RESTLESS THAT IT IS HARD TO SIT STILL: NEARLY EVERY DAY
3. WORRYING TOO MUCH ABOUT DIFFERENT THINGS: NEARLY EVERY DAY
1. FEELING NERVOUS, ANXIOUS, OR ON EDGE: NEARLY EVERY DAY
2. NOT BEING ABLE TO STOP OR CONTROL WORRYING: NEARLY EVERY DAY
7. FEELING AFRAID AS IF SOMETHING AWFUL MIGHT HAPPEN: NEARLY EVERY DAY
GAD7 TOTAL SCORE: 19
6. BECOMING EASILY ANNOYED OR IRRITABLE: SEVERAL DAYS

## 2025-06-16 ASSESSMENT — PATIENT HEALTH QUESTIONNAIRE - PHQ9
SUM OF ALL RESPONSES TO PHQ9 QUESTIONS 1 AND 2: 0
1. LITTLE INTEREST OR PLEASURE IN DOING THINGS: NOT AT ALL
2. FEELING DOWN, DEPRESSED OR HOPELESS: NOT AT ALL

## 2025-06-16 ASSESSMENT — COLUMBIA-SUICIDE SEVERITY RATING SCALE - C-SSRS
6. HAVE YOU EVER DONE ANYTHING, STARTED TO DO ANYTHING, OR PREPARED TO DO ANYTHING TO END YOUR LIFE?: NO
2. HAVE YOU ACTUALLY HAD ANY THOUGHTS OF KILLING YOURSELF?: NO
1. IN THE PAST MONTH, HAVE YOU WISHED YOU WERE DEAD OR WISHED YOU COULD GO TO SLEEP AND NOT WAKE UP?: NO

## 2025-06-16 ASSESSMENT — PAIN SCALES - GENERAL: PAINLEVEL_OUTOF10: 0-NO PAIN

## 2025-06-16 NOTE — PROGRESS NOTES
Subjective   Patient ID: Jessica Brown is a 49 y.o. female who presents with chief complaint of worsening of her anxiety.  Patient is in the room with her  present.    HPI     This is a 49 years old [place]riot-speaking lady with medical history of anxiety disorder, depression, s/p COVID -19 illness 12/2021.  Patient is  presented for evaluation and treatment of the above complaints.  According to patient's , health anxiety is getting worse.  Has malaise, nervous, worse in am, better in pm.  Patient has ongoing issues with nausea, episodes.  Patient is taking amitriptyline at nighttime, having nausea in the morning, stated, that medication is not working.  Patient is weak.  Having headaches in the morning.     Has difficulty to concentrate.    Review of Systems   Constitutional:  Positive for activity change and fatigue. Negative for unexpected weight change.   Neurological:  Positive for weakness, light-headedness and headaches.   Psychiatric/Behavioral:  Positive for decreased concentration and sleep disturbance. Negative for suicidal ideas. The patient is nervous/anxious.        Objective   /62   Pulse 64   Temp 37.1 °C (98.8 °F) (Temporal)   Resp 16   Wt 64.4 kg (142 lb)   BMI 25.97 kg/m²     Physical Exam  Constitutional:       Appearance: Normal appearance.   HENT:      Head: Normocephalic and atraumatic.      Right Ear: External ear normal.      Left Ear: External ear normal.      Nose: Nose normal.      Mouth/Throat:      Mouth: Mucous membranes are moist.   Eyes:      Extraocular Movements: Extraocular movements intact.      Conjunctiva/sclera: Conjunctivae normal.      Pupils: Pupils are equal, round, and reactive to light.   Cardiovascular:      Rate and Rhythm: Normal rate.      Pulses: Normal pulses.      Heart sounds: Normal heart sounds.   Pulmonary:      Effort: Pulmonary effort is normal.      Breath sounds: Normal breath sounds.   Abdominal:      General: Abdomen is flat.  Bowel sounds are normal.      Palpations: Abdomen is soft.   Musculoskeletal:         General: Normal range of motion.      Cervical back: Normal range of motion and neck supple.   Skin:     General: Skin is warm and dry.   Neurological:      General: No focal deficit present.      Mental Status: She is alert.   Psychiatric:         Mood and Affect: Mood normal.         Behavior: Behavior normal.         Thought Content: Thought content normal.         Judgment: Judgment normal.         Assessment/Plan   Problem List Items Addressed This Visit    None  Visit Diagnoses         Codes      TIA (transient ischemic attack)    -  Primary G45.9    Relevant Orders    CT head w IV contrast      Vitamin B12 deficiency     E53.8    Relevant Orders    Vitamin B12 (Completed)      Vitamin D deficiency     E55.9    Relevant Orders    Vitamin D 25-Hydroxy,Total (for eval of Vitamin D levels) (Completed)      Malaise and fatigue     R53.81, R53.83    Relevant Orders    Thyroid Stimulating Hormone (Completed)    Magnesium (Completed)    Triiodothyronine, Free (Completed)    Hepatitis C antibody      Folate deficiency     E53.8    Relevant Orders    Folate (Completed)      Hyperlipidemia, unspecified hyperlipidemia type     E78.5    Relevant Orders    Comprehensive Metabolic Panel (Completed)    Lipid Panel (Completed)      Hyperglycemia, unspecified     R73.9    Relevant Orders    Hemoglobin A1C (Completed)      Myalgia     M79.10      Iron deficiency anemia, unspecified iron deficiency anemia type     D50.9    Relevant Orders    CBC w/5 Part Differential, [facility] (Completed)    Ferritin (Completed)    Iron and TIBC (Completed)      Anxiety     F41.9      SHERRON (generalized anxiety disorder)     F41.1    Relevant Medications    ALPRAZolam (Xanax) 0.25 mg tablet    escitalopram (Lexapro) 5 mg tablet    Other Relevant Orders    Drug Screen, Urine With Reflex to Confirmation (Completed)      Encounter for screening mammogram for  malignant neoplasm of breast     Z12.31    Relevant Orders    BI mammo bilateral screening tomosynthesis             Anxiety disorder.  Worse, despite current therapy.  Stop Fluoxetine.  Start on Lexapro.    Trial of Alprazolam.  OARRS:  July Law MD on 6/16/2025 11:58 AM  I have personally reviewed the OARRS report for Jessica Brown. I have considered the risks of abuse, dependence, addiction and diversion    Is the patient prescribed a combination of a benzodiazepine and opioid?  No    Last Urine Drug Screen / ordered today: Yes  Recent Results (from the past 8760 hours)   Drug Screen, Urine With Reflex to Confirmation    Collection Time: 06/16/25 12:07 PM   Result Value Ref Range    Fentanyl      medMATCH Fentanyl      Fentanyl      medMATCH Fentanyl      Norfentanyl      medMATCH Norfentanyl      Fentanyl Comments      Amphetamines      medMATCH Amphetamines      Amphetamine      medMATCH Amphetamine      Methamphetamine      medMATCH Methamphetamine      Amphetamines Comments      Barbiturates      medMATCH Barbiturates      Amobarbital      medMATCH Amobarbital      Butalbital      medMATCH Butalbital      Pentobarbital      medMATCH Pentobarbital      Phenobarbital      medMATCH Phenobarbital      Secobarbital      medMATCH Secobarbital      Barbiturates Comments      Benzodiazepines      medMATCH Benzodiazepines      Alphahydroxyalprazolam      medMATCH aOH alprazolam      Alphahydroxymidazolam      medMATCH aOH midazolam      Alphahydroxytriazolam      medMATCH aOH triazolam      Aminoclonazepam      medMATCH Aminoclonazepam      Hydroxyethylflurazepam      medMATCH [place],Et flurazepam      Lorazepam      medMATCH Lorazepam      Nordiazepam      medMATCH Nordiazepam      Oxazepam      medMATCH Oxazepam      Temazepam      medMATCH Temazepam      Benzodiazepines Comments      Cocaine Metabolite      medMATCH Cocaine Metab      Benzoylecgonine      medMATCH Benzoylecgonine      Cocaine Comments       Marijuana Metabolite      medMATCH Marijuana Metab      Marijuana Metabolite      medMATCH Marijuana Metab      Marijuana Comments      Methadone Metabolite      medMATCH Methadone      EDDP      medMATCH EDDP      Methadone      medMATCH Methadone Metab      Methadone Comments      Opiates      medMATCH Opiates      Codeine      medMATCH Codeine      Hydrocodone      medMATCH Hydrocodone      Hydromorphone      medMATCH Hydromorphone      Morphine      medMATCH Morphine      Norhydrocodone      medMATCH Norhydrocodone      Opiates Comments      Oxycodone      medMATCH Oxycodone      Noroxycodone      medMATCH Noroxycodone      Oxycodone      medMATCH Oxycodone      Oxymorphone      medMATCH Oxymorphone      Oxycodone Comments      Phencyclidine      medMATCH Phencyclidine      Phencyclidine      medMATCH Phencyclidine      Phencyclidine Comments      Creatinine      Specific Gravity      pH      Oxidant      Abnormal Specimen Validity Test:      Notes and Comments      Patient Historical Report       Results are as expected.     Clinical rationale for not completing a Urine Drug Screen: done      Controlled Substance Agreement:  Date of the Last Agreement: 6/16/2025.  Reviewed Controlled Substance Agreement including but not limited to the benefits, risks, and alternatives to treatment with a Controlled Substance medication(s).    Benzodiazepines:  What is the patient's goal of therapy? Help with anxiety.  Is this being achieved with current treatment? yes    SHERRON-7:  Over the last 2 weeks, how often have you been bothered by any of the following problems?  Feeling nervous, anxious, or on edge: 3  Not being able to stop or control worrying: 3  Worrying too much about different things: 3  Trouble relaxing: 3  Being so restless that it is hard to sit still: 3  Becoming easily annoyed or irritable: 1  Feeling afraid as if something awful might happen: 3  SHERRON-7 Total Score: 19        Activities of Daily Living:   Is  your overall impression that this patient is benefiting (symptom reduction outweighs side effects) from benzodiazepine therapy? Yes     1. Physical Functioning: Better  2. Family Relationship: Better  3. Social Relationship: Better  4. Mood: Better  5. Sleep Patterns: Better  6. Overall Function: Better    Cyclic vomiting syndrome.  Amitriptyline is not working.  Nausea episodes,  continue with Zofran.    Health maintenance.  Annual physical exam is up-to-date.  Last mammography  2/5/2024.   Referral is placed.  Vaccinations.   GYN  is up to date.       - H of COVID -19 illness 12/2021.   Recovered.      -H of Candida laryngitis, esophagitis.  Resolved now.     -Mild vitamin D deficiency.  Take vitamin D daily.     -H of Elevated liver enzymes.  Liver ultrasound.  Repeat Blood work.     - H of Abnormal mammography August 2020.  S/p stereotactic core biopsy lower outer with Top-Hat clip placement 8/25/2020.  Stromal fibrosis and apocrine metaplastic cysts.  Microcalcifications identified.  Repeat mammography in 1 year, referral is placed.     -Overweight with BMI  25.97  Diet, exercise.  Keep ideal BMI less than 25.  Return in 2 weeks.

## 2025-06-17 VITALS
RESPIRATION RATE: 16 BRPM | TEMPERATURE: 98.8 F | WEIGHT: 142 LBS | HEART RATE: 64 BPM | BODY MASS INDEX: 25.97 KG/M2 | SYSTOLIC BLOOD PRESSURE: 102 MMHG | DIASTOLIC BLOOD PRESSURE: 62 MMHG

## 2025-06-17 LAB — HCV AB SERPL QL IA: NORMAL

## 2025-06-17 ASSESSMENT — ENCOUNTER SYMPTOMS
NERVOUS/ANXIOUS: 1
UNEXPECTED WEIGHT CHANGE: 0
ACTIVITY CHANGE: 1
DECREASED CONCENTRATION: 1
FATIGUE: 1
LIGHT-HEADEDNESS: 1
SLEEP DISTURBANCE: 1
HEADACHES: 1
WEAKNESS: 1

## 2025-06-19 LAB
AMPHETAMINES UR QL: NEGATIVE NG/ML
BARBITURATES UR QL: NEGATIVE NG/ML
BENZODIAZ UR QL: NEGATIVE NG/ML
BZE UR QL: NEGATIVE NG/ML
CODEINE UR-MCNC: NEGATIVE NG/ML
CREAT UR-MCNC: 83.9 MG/DL
DRUG SCREEN COMMENT UR-IMP: NORMAL
FENTANYL UR QL SCN: NEGATIVE NG/ML
FERRITIN SERPL-MCNC: 12 NG/ML (ref 16–232)
HYDROCODONE UR-MCNC: NEGATIVE NG/ML
HYDROMORPHONE UR-MCNC: NEGATIVE NG/ML
IRON SATN MFR SERPL: 33 % (CALC) (ref 16–45)
IRON SERPL-MCNC: 110 MCG/DL (ref 40–190)
MAGNESIUM SERPL-MCNC: 2.2 MG/DL (ref 1.5–2.5)
METHADONE UR QL: NEGATIVE NG/ML
MORPHINE UR-MCNC: NEGATIVE NG/ML
NORHYDROCODONE UR CFM-MCNC: NEGATIVE NG/ML
OPIATES UR QL: NORMAL NG/ML
OXIDANTS UR QL: NEGATIVE MCG/ML
OXYCODONE UR QL: NEGATIVE NG/ML
PCP UR QL: NEGATIVE NG/ML
PH UR: 7.6 [PH] (ref 4.5–9)
QUEST NOTES AND COMMENTS: NORMAL
T3FREE SERPL-MCNC: 3.1 PG/ML (ref 2.3–4.2)
THC UR QL: NEGATIVE NG/ML
TIBC SERPL-MCNC: 330 MCG/DL (CALC) (ref 250–450)

## 2025-06-26 DIAGNOSIS — F41.1 GAD (GENERALIZED ANXIETY DISORDER): ICD-10-CM

## 2025-06-26 RX ORDER — ALPRAZOLAM 0.25 MG/1
0.25 TABLET ORAL 3 TIMES DAILY PRN
Qty: 30 TABLET | Refills: 0 | Status: SHIPPED | OUTPATIENT
Start: 2025-06-26 | End: 2025-07-06

## (undated) DEVICE — CUFF, TOURNIQUET, 18 X 4, DUAL PORT/SNGL BLADDER, DISP, LF

## (undated) DEVICE — BANDAGE, ELASTIC, PREMIUM, SELF-CLOSE, 2 IN X 5 YD, STERILE

## (undated) DEVICE — COVER HANDLE LIGHT, STERIS, BLUE, STERILE

## (undated) DEVICE — NEEDLE, HYPODERMIC, REGULAR WALL, REGULAR BEVEL, 18 G X 1.5 IN

## (undated) DEVICE — APPLICATOR, CHLORAPREP, W/ORANGE TINT, 26ML

## (undated) DEVICE — DRESSING, GAUZE, PETROLATUM, STRIP, XEROFORM, 1 X 8 IN, STERILE

## (undated) DEVICE — SUTURE, ETHILON, 4-0, BLK, MONO, PS-2 18

## (undated) DEVICE — PADDING, CAST, SOFT, 2 X 4YDS

## (undated) DEVICE — SYRINGE, 10 CC, LUER LOCK

## (undated) DEVICE — SOLUTION, IRRIGATION, SODIUM CHLORIDE 0.9%, 1000 ML, POUR BOTTLE

## (undated) DEVICE — GLOVE, PROTEXIS PI CLASSIC, SZ-8.0, PF, PF, LF

## (undated) DEVICE — NEEDLE, HYPODERMIC, REGULAR WALL, REGULAR BEVEL, 25 G X 1.5 IN

## (undated) DEVICE — TOWEL PACK, STERILE, 4/PACK, BLUE

## (undated) DEVICE — KIT, UPPER EXTREMITY, CUSTOM, PORTAGE